# Patient Record
Sex: MALE | Race: BLACK OR AFRICAN AMERICAN | Employment: UNEMPLOYED | URBAN - METROPOLITAN AREA
[De-identification: names, ages, dates, MRNs, and addresses within clinical notes are randomized per-mention and may not be internally consistent; named-entity substitution may affect disease eponyms.]

---

## 2017-10-27 ENCOUNTER — OFFICE VISIT (OUTPATIENT)
Dept: FAMILY MEDICINE CLINIC | Age: 82
End: 2017-10-27

## 2017-10-27 VITALS
HEIGHT: 63 IN | DIASTOLIC BLOOD PRESSURE: 75 MMHG | BODY MASS INDEX: 20.02 KG/M2 | WEIGHT: 113 LBS | HEART RATE: 83 BPM | TEMPERATURE: 97.5 F | SYSTOLIC BLOOD PRESSURE: 169 MMHG

## 2017-10-27 DIAGNOSIS — I10 ESSENTIAL HYPERTENSION: ICD-10-CM

## 2017-10-27 DIAGNOSIS — E11.9 CONTROLLED TYPE 2 DIABETES MELLITUS WITHOUT COMPLICATION, WITHOUT LONG-TERM CURRENT USE OF INSULIN (HCC): Primary | ICD-10-CM

## 2017-10-27 DIAGNOSIS — G25.0 ESSENTIAL TREMOR: ICD-10-CM

## 2017-10-27 DIAGNOSIS — B35.1 ONYCHOMYCOSIS: ICD-10-CM

## 2017-10-27 DIAGNOSIS — N40.1 BENIGN PROSTATIC HYPERPLASIA (BPH) WITH STRAINING ON URINATION: ICD-10-CM

## 2017-10-27 DIAGNOSIS — R39.16 BENIGN PROSTATIC HYPERPLASIA (BPH) WITH STRAINING ON URINATION: ICD-10-CM

## 2017-10-27 DIAGNOSIS — E78.2 MIXED HYPERLIPIDEMIA: ICD-10-CM

## 2017-10-27 DIAGNOSIS — H53.8 BLURRY VISION, BILATERAL: ICD-10-CM

## 2017-10-27 DIAGNOSIS — Z79.899 ENCOUNTER FOR LONG-TERM (CURRENT) USE OF MEDICATIONS: ICD-10-CM

## 2017-10-27 DIAGNOSIS — Z23 ENCOUNTER FOR IMMUNIZATION: ICD-10-CM

## 2017-10-27 LAB
BILIRUB UR QL STRIP: NEGATIVE
GLUCOSE POC: 441 MG/DL
GLUCOSE UR-MCNC: NORMAL MG/DL
KETONES P FAST UR STRIP-MCNC: NEGATIVE MG/DL
PH UR STRIP: 6 [PH] (ref 4.6–8)
PROT UR QL STRIP: NEGATIVE MG/DL
SP GR UR STRIP: 1.02 (ref 1–1.03)
UA UROBILINOGEN AMB POC: NORMAL (ref 0.2–1)
URINALYSIS CLARITY POC: CLEAR
URINALYSIS COLOR POC: YELLOW
URINE BLOOD POC: NEGATIVE
URINE LEUKOCYTES POC: NORMAL
URINE NITRITES POC: NEGATIVE

## 2017-10-27 RX ORDER — SIMVASTATIN 20 MG/1
20 TABLET, FILM COATED ORAL
Qty: 30 TAB | Refills: 2 | Status: SHIPPED | OUTPATIENT
Start: 2017-10-27 | End: 2018-02-07 | Stop reason: SDUPTHER

## 2017-10-27 RX ORDER — DUTASTERIDE AND TAMSULOSIN HYDROCHLORIDE CAPSULES .5; .4 MG/1; MG/1
1 CAPSULE ORAL DAILY
Qty: 90 CAP | Refills: 3 | Status: SHIPPED | OUTPATIENT
Start: 2017-10-27 | End: 2017-12-27

## 2017-10-27 RX ORDER — GLIMEPIRIDE 2 MG/1
2 TABLET ORAL
Qty: 30 TAB | Refills: 2 | Status: SHIPPED | OUTPATIENT
Start: 2017-10-27 | End: 2018-02-07 | Stop reason: SDUPTHER

## 2017-10-27 RX ORDER — LISINOPRIL AND HYDROCHLOROTHIAZIDE 12.5; 2 MG/1; MG/1
1 TABLET ORAL DAILY
Qty: 30 TAB | Refills: 2 | Status: SHIPPED | OUTPATIENT
Start: 2017-10-27 | End: 2017-12-27

## 2017-10-27 RX ORDER — METFORMIN HYDROCHLORIDE 1000 MG/1
1000 TABLET ORAL 2 TIMES DAILY WITH MEALS
Qty: 60 TAB | Refills: 2 | Status: SHIPPED | OUTPATIENT
Start: 2017-10-27 | End: 2018-02-07 | Stop reason: SDUPTHER

## 2017-10-27 NOTE — PROGRESS NOTES
Subjective:     Chief Complaint   Patient presents with    Diabetes    Shaking     with the hands having a hard time eating x 5yrs    Immunization/Injection      He  is a 80 y.o. male who presents for evaluation of:  New pt. Coming from [facility]    Diabetes Mellitus:  Taking meds, home glucose monitoring: is performed regularly -has recently been using metformin and glimepiride sparingly to help avoid running out of medication and glucose readings have been in increasing into the 100-200 range. Of note, the patient's son reports to me that he had significant issues with hypoglycemia that led to a coma while in [facility] and ended up having to stay in a hospital and and [facility] for an additional few months until his sugars were well-controlled. Reports no polyuria or polydipsia, no chest pain, dyspnea or TIA's, no numbness, tingling or pain in extremities  Pt is a non smoker. No results found for: VMQ1OUSC, HBA1C, MCACR, MCAU, LDL, LDLC, DLDLP, DESIREE, CREAPOC, MCREA, ACREA, CREA, [id number], ALU2TBVT   No results found for: CGFR, GFRAA, GFRNA, CRCLT   No results found for: TSH, TSHEXT, TSHEXT      ROS  Gen - no fever/chills  Resp - no dyspnea or cough  CV - no chest pain or COYNE   - +nocturia, straining to urinate, post void dribbling, urinary frequency  Rest per HPI    No past medical history on file. No past surgical history on file. No current outpatient prescriptions on file prior to visit. No current facility-administered medications on file prior to visit.        Objective:     Vitals:    10/27/17 1348   BP: 169/75   Pulse: 83   Temp: 97.5 °F (36.4 °C)   TempSrc: Oral   Weight: 113 lb (51.3 kg)   Height: 5' 3.39\" (1.61 m)     Physical Examination:  General appearance - alert, well appearing, and in no distress  Eyes -sclera anicteric  Neck - supple, no significant adenopathy, no thyromegaly, no bruits  Chest - clear to auscultation, no wheezes, rales or rhonchi, symmetric air entry  Heart - normal rate, regular rhythm, normal S1, S2, no murmurs, rubs, clicks or gallops  Neurological - alert, oriented, no focal findings or movement disorder noted, + action tremor  Extr - no edema  Feet - normal exam with normal sensation including vibration, no skin breakdown, +onychomycosis    Assessment/ Plan:   Diagnoses and all orders for this visit:    1. Controlled type 2 diabetes mellitus without complication, without long-term current use of insulin ([address]- appears controlled, will check labs soon. Sending to ophthalmology for routine eye care. May end up seeing patient at [facility]. Nas's to be able to get him to [facility] for eye exam if unable to use access now and next appointment  -     AMB POC GLUCOSE BLOOD, BY GLUCOSE MONITORING DEVICE  -     AMB POC URINALYSIS DIP STICK MANUAL W/O MICRO  -     metFORMIN (GLUCOPHAGE) 1,000 mg tablet; Take 1 Tab by mouth two (2) times daily (with meals). -     glimepiride (AMARYL) 2 mg tablet; Take 1 Tab by mouth every morning.  -     HEMOGLOBIN A1C WITH EAG; Future  -     LIPID PANEL; Future  -     METABOLIC PANEL, COMPREHENSIVE; Future  -     TSH 3RD GENERATION; Future  -     REFERRAL TO OPHTHALMOLOGY    2. Mixed hyperlipidemia-stable on Zocor so refilling this and checking labs  -     simvastatin (ZOCOR) 20 mg tablet; Take 1 Tab by mouth nightly for 360 days. For cholesterol  -     HEMOGLOBIN A1C WITH EAG; Future  -     LIPID PANEL; Future  -     METABOLIC PANEL, COMPREHENSIVE; Future  -     TSH 3RD GENERATION; Future    3. Essential hypertension-uncontrolled today but did not take meds today. Switching from his ramipril/HCTZ to lisinopril/HCTZ for cost and will recheck blood pressure in 2 weeks  -     lisinopril-hydroCHLOROthiazide (PRINZIDE, ZESTORETIC) 20-12.5 mg per tablet; Take 1 Tab by mouth daily.  -     METABOLIC PANEL, COMPREHENSIVE; Future    4. Encounter for long-term (current) use of medications  -     CBC W/O DIFF; Future  -     HEMOGLOBIN A1C WITH EAG;  Future  - LIPID PANEL; Future  -     METABOLIC PANEL, COMPREHENSIVE; Future  -     TSH 3RD GENERATION; Future    5. Blurry vision, bilateral-will send to ophthalmology, will try to use access now and next appointment unless he does not qualify  -     REFERRAL TO OPHTHALMOLOGY    6. Benign prostatic hyperplasia (BPH) with straining on urination-symptoms consistent with BPH so we will start Callum to be picked up at crossover. -     Dutasteride-Tamsulosin (CALLUM) 0.5-0.4 mg CM24; Take 1 Tab by mouth daily. 7. Encounter for immunization  -     Influenza virus vaccine (QUADRIVALENT PRES FREE SYRINGE) IM ([id number])    8. Essential tremor-noted on exam, would consider propranolol depending on extent of tremor and blood pressure on current medication    I have discussed the diagnosis with the patient and the intended plan as seen in the above orders. The patient has received an after-visit summary and questions were answered concerning future plans. I have discussed medication side effects and warnings with the patient as well. The patient verbalizes understanding and agreement with the plan. Follow-up Disposition:  Return in about 2 weeks (around 11/10/2017), or if symptoms worsen or fail to improve, for blood pressure check.

## 2017-10-27 NOTE — PROGRESS NOTES
Requests flu vaccine. Denies fever and egg allergy. VIS information sheet given to patient. Explained possible s/e. Reviewed s/sx indicating need to be seen in ER. Patient had no adverse reaction at time of discharge. Entered into VIIS.

## 2017-10-27 NOTE — PROGRESS NOTES
Results for orders placed or performed in visit on 10/27/17   AMB POC GLUCOSE BLOOD, BY GLUCOSE MONITORING DEVICE   Result Value Ref Range    Glucose  mg/dL   AMB POC URINALYSIS DIP STICK MANUAL W/O MICRO   Result Value Ref Range    Color (UA POC) Yellow     Clarity (UA POC) Clear     Glucose (UA POC) 4+ Negative    Bilirubin (UA POC) Negative Negative    Ketones (UA POC) Negative Negative    Specific gravity (UA POC) 1.020 1.001 - 1.035    Blood (UA POC) Negative Negative    pH (UA POC) 6 4.6 - 8.0    Protein (UA POC) Negative Negative mg/dL    Urobilinogen (UA POC) normal 0.2 - 1    Nitrites (UA POC) Negative Negative    Leukocyte esterase (UA POC) Trace Negative

## 2017-10-27 NOTE — PROGRESS NOTES
Patient and his family member seen for discharge and no  needed per the patient's family member. We reviewed the AVS, prescriptions,pharmacy location and the Good Rx coupon card. The labs are to be drawn before his follow-up visit which has been scheduled. I spoke with Dr. Osmar Swift about his request to schedule the patient for a [facility] exam since this requires that the patient transfer to [facility] and complete a financial screening. Per Dr. Osmar Swift, the patient needs more than a basic exam at something like [facility]. Also, the patient travels back and forth from [facility] and likely has not been in [facility] for 6 months to qualify for [facility]. Per Dr. Osmar Swift, the need for a diabetic eye exam will be addressed at the patient's follow-up appointment and will probably be a referral to [facility] in order to schedule an appointment at [facility] eye. This was explained to the patient and his family member who expressed understanding.  Erich Peck RN

## 2017-11-08 ENCOUNTER — HOSPITAL ENCOUNTER (OUTPATIENT)
Dept: LAB | Age: 82
Discharge: HOME | End: 2017-11-08

## 2017-11-08 ENCOUNTER — TELEPHONE (OUTPATIENT)
Dept: FAMILY MEDICINE CLINIC | Age: 82
End: 2017-11-08

## 2017-11-08 ENCOUNTER — OFFICE VISIT (OUTPATIENT)
Dept: FAMILY MEDICINE CLINIC | Age: 82
End: 2017-11-08

## 2017-11-08 VITALS
WEIGHT: 114 LBS | HEART RATE: 73 BPM | BODY MASS INDEX: 19.95 KG/M2 | OXYGEN SATURATION: 97 % | SYSTOLIC BLOOD PRESSURE: 218 MMHG | TEMPERATURE: 97.6 F | DIASTOLIC BLOOD PRESSURE: 99 MMHG

## 2017-11-08 DIAGNOSIS — Z79.899 ENCOUNTER FOR LONG-TERM (CURRENT) USE OF MEDICATIONS: ICD-10-CM

## 2017-11-08 DIAGNOSIS — I10 ESSENTIAL HYPERTENSION: ICD-10-CM

## 2017-11-08 DIAGNOSIS — E78.2 MIXED HYPERLIPIDEMIA: ICD-10-CM

## 2017-11-08 DIAGNOSIS — E11.9 CONTROLLED TYPE 2 DIABETES MELLITUS WITHOUT COMPLICATION, WITHOUT LONG-TERM CURRENT USE OF INSULIN (HCC): Primary | ICD-10-CM

## 2017-11-08 LAB
ALBUMIN SERPL-MCNC: 3.8 G/DL (ref 3.5–5)
ALBUMIN/GLOB SERPL: 0.9 {RATIO} (ref 1.1–2.2)
ALP SERPL-CCNC: 52 U/L (ref 45–117)
ALT SERPL-CCNC: 15 U/L (ref 12–78)
ANION GAP SERPL CALC-SCNC: 6 MMOL/L (ref 5–15)
AST SERPL-CCNC: 18 U/L (ref 15–37)
BILIRUB SERPL-MCNC: 0.4 MG/DL (ref 0.2–1)
BUN SERPL-MCNC: 11 MG/DL (ref 6–20)
BUN/CREAT SERPL: 10 (ref 12–20)
CALCIUM SERPL-MCNC: 9.3 MG/DL (ref 8.5–10.1)
CHLORIDE SERPL-SCNC: 102 MMOL/L (ref 97–108)
CHOLEST SERPL-MCNC: 222 MG/DL
CO2 SERPL-SCNC: 29 MMOL/L (ref 21–32)
CREAT SERPL-MCNC: 1.06 MG/DL (ref 0.7–1.3)
ERYTHROCYTE [DISTWIDTH] IN BLOOD BY AUTOMATED COUNT: 12.9 % (ref 11.5–14.5)
EST. AVERAGE GLUCOSE BLD GHB EST-MCNC: 174 MG/DL
GLOBULIN SER CALC-MCNC: 4.1 G/DL (ref 2–4)
GLUCOSE POC: NORMAL MG/DL
GLUCOSE SERPL-MCNC: 139 MG/DL (ref 65–100)
HBA1C MFR BLD: 7.7 % (ref 4.2–6.3)
HCT VFR BLD AUTO: 34 % (ref 36.6–50.3)
HDLC SERPL-MCNC: 91 MG/DL
HDLC SERPL: 2.4 {RATIO} (ref 0–5)
HGB BLD-MCNC: 10.9 G/DL (ref 12.1–17)
LDLC SERPL CALC-MCNC: 117.8 MG/DL (ref 0–100)
LIPID PROFILE,FLP: ABNORMAL
MCH RBC QN AUTO: 28 PG (ref 26–34)
MCHC RBC AUTO-ENTMCNC: 32.1 G/DL (ref 30–36.5)
MCV RBC AUTO: 87.4 FL (ref 80–99)
PLATELET # BLD AUTO: 332 K/UL (ref 150–400)
POTASSIUM SERPL-SCNC: 5.1 MMOL/L (ref 3.5–5.1)
PROT SERPL-MCNC: 7.9 G/DL (ref 6.4–8.2)
RBC # BLD AUTO: 3.89 M/UL (ref 4.1–5.7)
SODIUM SERPL-SCNC: 137 MMOL/L (ref 136–145)
TRIGL SERPL-MCNC: 66 MG/DL (ref ?–150)
TSH SERPL DL<=0.05 MIU/L-ACNC: 2.54 UIU/ML (ref 0.36–3.74)
VLDLC SERPL CALC-MCNC: 13.2 MG/DL
WBC # BLD AUTO: 6.6 K/UL (ref 4.1–11.1)

## 2017-11-08 PROCEDURE — 83036 HEMOGLOBIN GLYCOSYLATED A1C: CPT | Performed by: FAMILY MEDICINE

## 2017-11-08 PROCEDURE — 80061 LIPID PANEL: CPT | Performed by: FAMILY MEDICINE

## 2017-11-08 PROCEDURE — 85027 COMPLETE CBC AUTOMATED: CPT | Performed by: FAMILY MEDICINE

## 2017-11-08 PROCEDURE — 84443 ASSAY THYROID STIM HORMONE: CPT | Performed by: FAMILY MEDICINE

## 2017-11-08 PROCEDURE — 80053 COMPREHEN METABOLIC PANEL: CPT | Performed by: FAMILY MEDICINE

## 2017-11-08 NOTE — TELEPHONE ENCOUNTER
Telephone call received from the patient's grandson asking where the [facility] clinic is today and whether the patient should still go to his appointment with the doctor today since he hasn't had his lab work done yet. Per chart review, the patient was instructed to have the labs done before today's provider visit so that the results could be discussed at today's visit. Advised the patient's grandson that the patient should still come to his appointment with the provider today at 1:15. One of the ordered labs requires fasting. The patient's grandson stated that they will bring the patient to the [place] [id number] clinic this morning for the lab work, then get something to eat and return to the clinic for the 1:15 appointment. They understand that the lab results will not be available at the provider appointment.  Julia Rodriguez RN

## 2017-11-08 NOTE — PATIENT INSTRUCTIONS

## 2017-11-08 NOTE — PROGRESS NOTES
AVS printed and reviewed. Medications highlighted and discussed. Instructed to bring all pill bottles to each visit for review. Today's blood pressure reviewed. Reasons to go to an ER reviewed. Signs/sx of stroke reviewed.

## 2017-11-08 NOTE — PROGRESS NOTES
Subjective:     Chief Complaint   Patient presents with    Diabetes     follow up         He  is a 80 y.o. male who presents for follow-up on HTN. Since LOV, Pt just got labs done this AM.     Pt is accompanied by family members and they do not know which medications he is on. Son reached via phone referred to a notebook. Pt denies any CP, palpitations, visual changes nor dizziness/HA. Son via telephone notes Pt is also taking insulin. Sugars at home have been ranging in the low 100s-160s. ROS  Gen - no fever/chills  Resp - no dyspnea or cough  CV - no chest pain or COYNE  Rest per HPI    Past Medical History:   Diagnosis Date    Controlled type 2 diabetes mellitus without complication, without long-term current use of insulin ([facility] [address] 10/27/2017    Essential hypertension 10/27/2017    Mixed hyperlipidemia 10/27/2017     No past surgical history on file. Current Outpatient Prescriptions on File Prior to Visit   Medication Sig Dispense Refill    metFORMIN (GLUCOPHAGE) 1,000 mg tablet Take 1 Tab by mouth two (2) times daily (with meals). 60 Tab 2    lisinopril-hydroCHLOROthiazide (PRINZIDE, ZESTORETIC) 20-12.5 mg per tablet Take 1 Tab by mouth daily. 30 Tab 2    glimepiride (AMARYL) 2 mg tablet Take 1 Tab by mouth every morning. 30 Tab 2    simvastatin (ZOCOR) 20 mg tablet Take 1 Tab by mouth nightly for 360 days. For cholesterol 30 Tab 2    Dutasteride-Tamsulosin (CALLUM) 0.5-0.4 mg CM24 Take 1 Tab by mouth daily. 90 Cap 3     No current facility-administered medications on file prior to visit.          Objective:     Vitals:    11/08/17 1123 11/08/17 1127   BP: 200/90 (!) 218/99   Pulse: 73    Temp: 97.6 °F (36.4 °C)    TempSrc: Oral    SpO2: 97%    Weight: 114 lb (51.7 kg)        Physical Examination:  General appearance - alert, well appearing, and in no distress  Eyes -sclera anicteric  Neck - supple, no significant adenopathy, no thyromegaly  Chest - clear to auscultation, no wheezes, rales or rhonchi, symmetric air entry  Heart - normal rate, regular rhythm, normal S1, S2, no murmurs, rubs, clicks or gallops  Neurological - alert, oriented, no focal findings or movement disorder noted  Abdomen-BS present/WNL x 4 quads, non-tender/distended, soft,no organomegaly    Assessment/ Plan:   Diagnoses and all orders for this visit:    1. Controlled type 2 diabetes mellitus without complication, without long-term current use of insulin (HCC)  -     AMB POC GLUCOSE BLOOD, BY GLUCOSE MONITORING DEVICE  -     HEMOGLOBIN A1C WITH EAG  -     LIPID PANEL  -     METABOLIC PANEL, COMPREHENSIVE  -     TSH 3RD GENERATION    2. Encounter for long-term (current) use of medications  -     CBC W/O DIFF  -     HEMOGLOBIN A1C WITH EAG  -     LIPID PANEL  -     METABOLIC PANEL, COMPREHENSIVE  -     TSH 3RD GENERATION    3. Mixed hyperlipidemia  -     HEMOGLOBIN A1C WITH EAG  -     LIPID PANEL  -     METABOLIC PANEL, COMPREHENSIVE  -     TSH 3RD GENERATION    4. Essential hypertension  -     METABOLIC PANEL, COMPREHENSIVE       No S&S of HTN crisi. Strongly counseled g-son present that he needs to confirm Pt is taking BP Rx w/ his father and/or pharmacy. Given confusion r/t medications, will have Pt RTC in 1-2 days for RN visit for med rec visit. Advised to bring BP log to follow-up RN visit. Can adjust medications PRN if BP still elevated while taking HTN Rx.     ? BP response since there is no confirmation Pt has been taking Lisinopril/HCTZ for BP. Change POC PRN based on med rec, labs and BP response. I have discussed the diagnosis with the patient and the intended plan as seen in the above orders. The patient has received an after-visit summary and questions were answered concerning future plans. I have discussed medication side effects and warnings with the patient as well. The patient verbalizes understanding and agreement with the plan.     Follow-up Disposition: Not on File

## 2017-11-08 NOTE — LETTER
11/21/2017 2:55 PM 
 
Mr. Valdo Kramer [address] [id number] Dear Valdo Kramer: 
 
Please find your most recent results below. Resulted Orders HEMOGLOBIN A1C WITH EAG Result Value Ref Range Hemoglobin A1c 7.7 (H) 4.2 - 6.3 % Est. average glucose 174 mg/dL Comment:  
   (NOTE) The eAG should be interpreted with patient characteristics in mind  
since ethnicity, interindividual differences, red cell lifespan,  
variation in rates of glycation, etc. may affect the validity of the  
calculation. CBC W/O DIFF Result Value Ref Range WBC 6.6 4.1 - 11.1 K/uL  
 RBC 3.89 (L) 4.10 - 5.70 M/uL  
 HGB 10.9 (L) 12.1 - 17.0 g/dL HCT 34.0 (L) 36.6 - 50.3 % MCV 87.4 80.0 - 99.0 FL  
 MCH 28.0 26.0 - 34.0 PG  
 MCHC 32.1 30.0 - 36.5 g/dL  
 RDW 12.9 11.5 - 14.5 % PLATELET 208 352 - 715 K/uL METABOLIC PANEL, COMPREHENSIVE Result Value Ref Range Sodium 137 136 - 145 mmol/L Potassium 5.1 3.5 - 5.1 mmol/L Chloride 102 97 - 108 mmol/L  
 CO2 29 21 - 32 mmol/L Anion gap 6 5 - 15 mmol/L Glucose 139 (H) 65 - 100 mg/dL BUN 11 6 - 20 MG/DL Creatinine 1.06 0.70 - 1.30 MG/DL  
 BUN/Creatinine ratio 10 (L) 12 - 20 GFR est AA >60 >60 ml/min/1.73m2 GFR est non-AA >60 >60 ml/min/1.73m2 Comment:  
   Estimated GFR is calculated using the IDMS-traceable Modification of Diet in Renal Disease (MDRD) Study equation, reported for both  Americans (GFRAA) and non- Americans (GFRNA), and normalized to 1.73m2 body surface area. The physician must decide which value applies to the patient. The MDRD study equation should only be used in individuals age 25 or older. It has not been validated for the following: pregnant women, patients with serious comorbid conditions, or on certain medications, or persons with extremes of body size, muscle mass, or nutritional status. Calcium 9.3 8.5 - 10.1 MG/DL  Bilirubin, total 0.4 0.2 - 1.0 MG/DL  
 ALT (SGPT) 15 12 - 78 U/L  
 AST (SGOT) 18 15 - 37 U/L Alk. phosphatase 52 45 - 117 U/L Protein, total 7.9 6.4 - 8.2 g/dL Albumin 3.8 3.5 - 5.0 g/dL Globulin 4.1 (H) 2.0 - 4.0 g/dL A-G Ratio 0.9 (L) 1.1 - 2.2 LIPID PANEL Result Value Ref Range LIPID PROFILE Cholesterol, total 222 (H) <200 MG/DL Triglyceride 66 <150 MG/DL Comment:  
   Based on NCEP-ATP III:  Triglycerides <150 mg/dL  is considered normal, 150-199 mg/dL  borderline high,  200-499 mg/dL high and  greater than or equal to 500 mg/dL very high. HDL Cholesterol 91 MG/DL Comment:  
   Based on NCEP ATP III, HDL Cholesterol <40 mg/dL is considered low and >60 mg/dL is elevated. LDL, calculated 117.8 (H) 0 - 100 MG/DL Comment:  
   Based on the NCEP-ATP: LDL-C concentrations are considered  optimal <100 mg/dL, 
near optimal/above Normal 100-129 mg/dL Borderline High: 130-159, High: 160-189 mg/dL Very High: Greater than or equal to 190 mg/dL VLDL, calculated 13.2 MG/DL  
 CHOL/HDL Ratio 2.4 0 - 5.0 TSH 3RD GENERATION Result Value Ref Range TSH 2.54 0.36 - 3.74 uIU/mL Comment:  
   (NOTE) Due to TSH heterogeneity, both structurally and degree of  
glycosylation, monoclonal antibodies used in the TSH assay may not  
accurately quantitate TSH. Therefore, this result should be  
correlated with clinical findings as well as with other assessments  
of thyroid function, e.g., free T4, free T3. 
  
 
 
RECOMMENDATIONS: Here is the message from Dr. Genia Kunz:  
Carter Medrano MD  [facility]    
    
   
    
  Diabetes control is close to goal and cholesterol is slightly high.  Hemoglobin slightly low at 10.9.  Planning to continue the same medications and make changes after rechecking labs in the future Please call me if you have any questions: [phone] Sincerely, 
 
 
Dr. Hamilton Garcia RN

## 2017-11-08 NOTE — PROGRESS NOTES
Coordination of Care  1. Have you been to the ER, urgent care clinic since your last visit? Hospitalized since your last visit? No    2. Have you seen or consulted any other health care providers outside of the [address] [place] [address] since your last visit? Include any pap smears or colon screening. No    Medications  Does the patient need refills?  YES    Learning Assessment Complete? yes    Results for orders placed or performed in visit on 11/08/17   AMB POC GLUCOSE BLOOD, BY GLUCOSE MONITORING DEVICE   Result Value Ref Range    Glucose  fasting mg/dL

## 2017-11-08 NOTE — MR AVS SNAPSHOT
Visit Information Date & Time Provider Department Dept. Phone Encounter #  
 11/8/2017 11:30 AM Zeenat Velasco NP [facility] 89 [id number] Follow-up Instructions Return in about 2 weeks (around 11/22/2017). Upcoming Health Maintenance Date Due HEMOGLOBIN A1C Q6M 1934 LIPID PANEL Q1 1934 MICROALBUMIN Q1 1/1/1944 EYE EXAM RETINAL OR DILATED Q1 1/1/1944 DTaP/Tdap/Td series (1 - Tdap) 1/1/1955 ZOSTER VACCINE AGE 60> 11/1/1993 GLAUCOMA SCREENING Q2Y 1/1/1999 Pneumococcal 65+ Low/Medium Risk (1 of 2 - PCV13) 1/1/1999 MEDICARE YEARLY EXAM 1/1/1999 FOOT EXAM Q1 10/27/2018 Allergies as of 11/8/2017  Review Complete On: 11/8/2017 By: Zeenat Velasco NP No Known Allergies Current Immunizations  Reviewed on 10/27/2017 Name Date Influenza Vaccine (Quad) PF 10/27/2017 Not reviewed this visit You Were Diagnosed With   
  
 Codes Comments Controlled type 2 diabetes mellitus without complication, without long-term current use of insulin ([facility] [address]    -  Primary ICD-10-CM: E11.9 ICD-9-CM: 250.00 Encounter for long-term (current) use of medications     ICD-10-CM: Z79.899 ICD-9-CM: V58.69 Mixed hyperlipidemia     ICD-10-CM: E78.2 ICD-9-CM: 272.2 Essential hypertension     ICD-10-CM: I10 
ICD-9-CM: 401.9 Vitals BP Pulse Temp Weight(growth percentile) SpO2 BMI  
 (!) 218/99 (BP 1 Location: Left arm, BP Patient Position: Sitting) 73 97.6 °F (36.4 °C) (Oral) 114 lb (51.7 kg) 97% 19.95 kg/m2 Smoking Status Never Smoker Vitals History BMI and BSA Data Body Mass Index Body Surface Area  
 19.95 kg/m 2 1.52 m 2 Preferred Pharmacy Pharmacy Name Phone [address] [phone] Your Updated Medication List  
  
   
This list is accurate as of: 11/8/17  1:43 PM.  Always use your most recent med list.  
  
  
  
  
 Dutasteride-Tamsulosin 0.5-0.4 mg Cm24 Commonly known as:  Ginny Teran Take 1 Tab by mouth daily. glimepiride 2 mg tablet Commonly known as:  AMARYL Take 1 Tab by mouth every morning. lisinopril-hydroCHLOROthiazide 20-12.5 mg per tablet Commonly known as:  Kendall Jean Take 1 Tab by mouth daily. metFORMIN 1,000 mg tablet Commonly known as:  GLUCOPHAGE Take 1 Tab by mouth two (2) times daily (with meals). simvastatin 20 mg tablet Commonly known as:  ZOCOR Take 1 Tab by mouth nightly for 360 days. For cholesterol We Performed the Following AMB POC GLUCOSE BLOOD, BY GLUCOSE MONITORING DEVICE [20212 CPT(R)] CBC W/O DIFF [79356 CPT(R)] HEMOGLOBIN A1C WITH EAG [63721 CPT(R)] LIPID PANEL [66250 CPT(R)] METABOLIC PANEL, COMPREHENSIVE [07733 CPT(R)] TSH 3RD GENERATION [55452 CPT(R)] Follow-up Instructions Return in about 2 weeks (around 11/22/2017). Patient Instructions DASH Diet: Care Instructions Your Care Instructions The DASH diet is an eating plan that can help lower your blood pressure. DASH stands for Dietary Approaches to Stop Hypertension. Hypertension is high blood pressure. The DASH diet focuses on eating foods that are high in calcium, potassium, and magnesium. These nutrients can lower blood pressure. The foods that are highest in these nutrients are fruits, vegetables, low-fat dairy products, nuts, seeds, and legumes. But taking calcium, potassium, and magnesium supplements instead of eating foods that are high in those nutrients does not have the same effect. The DASH diet also includes whole grains, fish, and poultry. The DASH diet is one of several lifestyle changes your doctor may recommend to lower your high blood pressure. Your doctor may also want you to decrease the amount of sodium in your diet.  Lowering sodium while following the DASH diet can lower blood pressure even further than just the DASH diet alone. Follow-up care is a key part of your treatment and safety. Be sure to make and go to all appointments, and call your doctor if you are having problems. It's also a good idea to know your test results and keep a list of the medicines you take. How can you care for yourself at home? Following the DASH diet · Eat 4 to 5 servings of fruit each day. A serving is 1 medium-sized piece of fruit, ½ cup chopped or canned fruit, 1/4 cup dried fruit, or 4 ounces (½ cup) of fruit juice. Choose fruit more often than fruit juice. · Eat 4 to 5 servings of vegetables each day. A serving is 1 cup of lettuce or raw leafy vegetables, ½ cup of chopped or cooked vegetables, or 4 ounces (½ cup) of vegetable juice. Choose vegetables more often than vegetable juice. · Get 2 to 3 servings of low-fat and fat-free dairy each day. A serving is 8 ounces of milk, 1 cup of yogurt, or 1 ½ ounces of cheese. · Eat 6 to 8 servings of grains each day. A serving is 1 slice of bread, 1 ounce of dry cereal, or ½ cup of cooked rice, pasta, or cooked cereal. Try to choose whole-grain products as much as possible. · Limit lean meat, poultry, and fish to 2 servings each day. A serving is 3 ounces, about the size of a deck of cards. · Eat 4 to 5 servings of nuts, seeds, and legumes (cooked dried beans, lentils, and split peas) each week. A serving is 1/3 cup of nuts, 2 tablespoons of seeds, or ½ cup of cooked beans or peas. · Limit fats and oils to 2 to 3 servings each day. A serving is 1 teaspoon of vegetable oil or 2 tablespoons of salad dressing. · Limit sweets and added sugars to 5 servings or less a week. A serving is 1 tablespoon jelly or jam, ½ cup sorbet, or 1 cup of lemonade. · Eat less than 2,300 milligrams (mg) of sodium a day. If you limit your sodium to 1,500 mg a day, you can lower your blood pressure even more. Tips for success · Start small. Do not try to make dramatic changes to your diet all at once. You might feel that you are missing out on your favorite foods and then be more likely to not follow the plan. Make small changes, and stick with them. Once those changes become habit, add a few more changes. · Try some of the following: ¨ Make it a goal to eat a fruit or vegetable at every meal and at snacks. This will make it easy to get the recommended amount of fruits and vegetables each day. ¨ Try yogurt topped with fruit and nuts for a snack or healthy dessert. ¨ Add lettuce, tomato, cucumber, and onion to sandwiches. ¨ Combine a ready-made pizza crust with low-fat mozzarella cheese and lots of vegetable toppings. Try using tomatoes, squash, spinach, broccoli, carrots, cauliflower, and onions. ¨ Have a variety of cut-up vegetables with a low-fat dip as an appetizer instead of chips and dip. ¨ Sprinkle sunflower seeds or chopped almonds over salads. Or try adding chopped walnuts or almonds to cooked vegetables. ¨ Try some vegetarian meals using beans and peas. Add garbanzo or kidney beans to salads. Make burritos and tacos with mashed king beans or black beans. Where can you learn more? Go to http://demond-felicia.info/. Enter E916 in the search box to learn more about \"DASH Diet: Care Instructions. \" Current as of: September 21, 2016 Content Version: 11.4 © 6445-5606 [employer]. Care instructions adapted under license by [employer] (which disclaims liability or warranty for this information). If you have questions about a medical condition or this instruction, always ask your healthcare professional. [place] [address] any warranty or liability for your use of this information. Introducing [facility]!    
 [facility] introduces DuckDuckGo patient portal. Now you can access parts of your medical record, email your doctor's office, and request medication refills online. 1. In your internet browser, go to [email or web address] 2. Click on the First Time User? Click Here link in the Sign In box. You will see the New Member Sign Up page. 3. Enter your Partnered Access Code exactly as it appears below. You will not need to use this code after youve completed the sign-up process. If you do not sign up before the expiration date, you must request a new code. · Partnered Access Code: [email or web address] Expires: 1/25/2018  2:31 PM 
 
4. Enter the last four digits of your Social Security Number (xxxx) and Date of Birth (mm/dd/yyyy) as indicated and click Submit. You will be taken to the next sign-up page. 5. Create a Partnered ID. This will be your Partnered login ID and cannot be changed, so think of one that is secure and easy to remember. 6. Create a Partnered password. You can change your password at any time. 7. Enter your Password Reset Question and Answer. This can be used at a later time if you forget your password. 8. Enter your e-mail address. You will receive e-mail notification when new information is available in [address] 9. Click Sign Up. You can now view and download portions of your medical record. 10. Click the Download Summary menu link to download a portable copy of your medical information. If you have questions, please visit the Frequently Asked Questions section of the Partnered website. Remember, Partnered is NOT to be used for urgent needs. For medical emergencies, dial 911. Now available from your iPhone and Android! Please provide this summary of care documentation to your next provider. If you have any questions after today's visit, please call [phone].

## 2017-11-09 NOTE — PROGRESS NOTES
Diabetes control is close to goal and cholesterol is slightly high. Hemoglobin slightly low at 10.9. Planning to continue the same medications and make changes after rechecking labs in the future.     Looks like Serge Membreno saw him just yesterday and his blood pressure continues to be a major issue with hypertensive urgency

## 2017-11-16 ENCOUNTER — CLINICAL SUPPORT (OUTPATIENT)
Dept: FAMILY MEDICINE CLINIC | Age: 82
End: 2017-11-16

## 2017-11-16 VITALS — DIASTOLIC BLOOD PRESSURE: 65 MMHG | HEART RATE: 79 BPM | SYSTOLIC BLOOD PRESSURE: 152 MMHG

## 2017-11-16 DIAGNOSIS — Z71.9 COUNSELED BY NURSE: Primary | ICD-10-CM

## 2017-11-16 DIAGNOSIS — R39.16 BENIGN PROSTATIC HYPERPLASIA (BPH) WITH STRAINING ON URINATION: ICD-10-CM

## 2017-11-16 DIAGNOSIS — N40.1 BENIGN PROSTATIC HYPERPLASIA (BPH) WITH STRAINING ON URINATION: ICD-10-CM

## 2017-11-16 RX ORDER — GUAIFENESIN 100 MG/5ML
100 LIQUID (ML) ORAL DAILY
COMMUNITY
End: 2018-01-21

## 2017-11-16 NOTE — PROGRESS NOTES
Pt here today with granddaughter Tony Hannon and I also talked with son, Livia Johnson over the phone. They have not been checking blood pressures at home because they did not understand to do this. I talked with granddaughter about please buying a blood pressure cuff and how to correctly measure his bp. Intake took his bp today and it was 152/65 while sitting in left arm. Pt is taking all meds as prescribed. He brought in an empty box of ASA that he was taking. He also brought in a bag of little white pills that were unlabeled and an RX from [place] country that are to be taken one as needed when his \"mouth swells\". He took this 3 times last week. His family has a medical record that they will review with information in [facility] and they think they will be able to figure out what this medication is called. Pt has not picked up his Kay from [facility] because he does not have an application in for [facility]. This med will be $54 for one month supply at [facility]. I told them to hold off on this at this time and to discuss at follow up. I did ask family to please bring in recorded blood pressures at next follow up.  Kathi Sharif RN

## 2017-11-21 NOTE — PROGRESS NOTES
I mailed pt his lab results with message from provider. He has family that speak [place].  Leopoldo Sheridan RN

## 2017-11-30 ENCOUNTER — OFFICE VISIT (OUTPATIENT)
Dept: FAMILY MEDICINE CLINIC | Age: 82
End: 2017-11-30

## 2017-11-30 VITALS
DIASTOLIC BLOOD PRESSURE: 82 MMHG | SYSTOLIC BLOOD PRESSURE: 180 MMHG | WEIGHT: 112 LBS | TEMPERATURE: 97.5 F | BODY MASS INDEX: 19.6 KG/M2 | HEART RATE: 77 BPM

## 2017-11-30 DIAGNOSIS — Z13.1 SCREENING FOR DIABETES MELLITUS (DM): ICD-10-CM

## 2017-11-30 DIAGNOSIS — E11.9 CONTROLLED TYPE 2 DIABETES MELLITUS WITHOUT COMPLICATION, WITHOUT LONG-TERM CURRENT USE OF INSULIN (HCC): ICD-10-CM

## 2017-11-30 DIAGNOSIS — I10 ESSENTIAL HYPERTENSION: Primary | ICD-10-CM

## 2017-11-30 LAB — GLUCOSE POC: NORMAL MG/DL

## 2017-11-30 NOTE — MR AVS SNAPSHOT
Visit Information Date & Time Provider Department Dept. Phone Encounter #  
 11/30/2017  9:45 AM Caryl Vinson MD [address] [phone] [id number] Follow-up Instructions Return in about 3 months (around 2/28/2018). Upcoming Health Maintenance Date Due MICROALBUMIN Q1 1/1/1944 EYE EXAM RETINAL OR DILATED Q1 1/1/1944 DTaP/Tdap/Td series (1 - Tdap) 1/1/1955 ZOSTER VACCINE AGE 60> 11/1/1993 GLAUCOMA SCREENING Q2Y 1/1/1999 Pneumococcal 65+ Low/Medium Risk (1 of 2 - PCV13) 1/1/1999 MEDICARE YEARLY EXAM 1/1/1999 HEMOGLOBIN A1C Q6M 5/8/2018 FOOT EXAM Q1 10/27/2018 LIPID PANEL Q1 11/8/2018 Allergies as of 11/30/2017  Review Complete On: 11/30/2017 By: Caryl Vinson MD  
 No Known Allergies Current Immunizations  Reviewed on 10/27/2017 Name Date Influenza Vaccine (Quad) PF 10/27/2017 Not reviewed this visit You Were Diagnosed With   
  
 Codes Comments Screening for diabetes mellitus (DM)    -  Primary ICD-10-CM: Z13.1 ICD-9-CM: V77.1 Vitals BP Pulse Temp Weight(growth percentile) BMI Smoking Status 180/82 (BP 1 Location: Left arm, BP Patient Position: Sitting) 77 97.5 °F (36.4 °C) (Oral) 112 lb (50.8 kg) 19.6 kg/m2 Never Smoker Vitals History BMI and BSA Data Body Mass Index Body Surface Area 19.6 kg/m 2 1.51 m 2 Preferred Pharmacy Pharmacy Name Phone [address] Your Updated Medication List  
  
   
This list is accurate as of: 11/30/17 11:32 AM.  Always use your most recent med list.  
  
  
  
  
 aspirin 81 mg chewable tablet Take 100 mg by mouth daily. Pt brought in box of Aspirin that was 100 mg tablets. B COMPLEX 1 PO Take  by mouth two (2) times a day. With folic acid and vitamin C.  
  
 CALCIUM CITRATE + PO Take  by mouth daily. Dutasteride-Tamsulosin 0.5-0.4 mg Cm24 Commonly known as:  Noble Nephew Take 1 Tab by mouth daily. glimepiride 2 mg tablet Commonly known as:  AMARYL Take 1 Tab by mouth every morning. lisinopril-hydroCHLOROthiazide 20-12.5 mg per tablet Commonly known as:  Katrinka Fat Take 1 Tab by mouth daily. metFORMIN 1,000 mg tablet Commonly known as:  GLUCOPHAGE Take 1 Tab by mouth two (2) times daily (with meals). OTHER Pt brought in a bag of little white pills that he takes for his mouth when it is swollen as needed. It was RX from [place] country and is unlabeled. He took it 3 times last week. simvastatin 20 mg tablet Commonly known as:  ZOCOR Take 1 Tab by mouth nightly for 360 days. For cholesterol We Performed the Following AMB POC GLUCOSE BLOOD, BY GLUCOSE MONITORING DEVICE [19735 CPT(R)] Follow-up Instructions Return in about 3 months (around 2/28/2018). Introducing [facility]! Randee Alarcon introduces BookNow patient portal. Now you can access parts of your medical record, email your doctor's office, and request medication refills online. 1. In your internet browser, go to [email or web address] 2. Click on the First Time User? Click Here link in the Sign In box. You will see the New Member Sign Up page. 3. Enter your BookNow Access Code exactly as it appears below. You will not need to use this code after youve completed the sign-up process. If you do not sign up before the expiration date, you must request a new code. · BookNow Access Code: [email or web address] Expires: 1/25/2018  2:31 PM 
 
4. Enter the last four digits of your Social Security Number (xxxx) and Date of Birth (mm/dd/yyyy) as indicated and click Submit. You will be taken to the next sign-up page. 5. Create a [employer] ID. This will be your [employer] login ID and cannot be changed, so think of one that is secure and easy to remember. 6. Create a Bloxr password. You can change your password at any time. 7. Enter your Password Reset Question and Answer. This can be used at a later time if you forget your password. 8. Enter your e-mail address. You will receive e-mail notification when new information is available in [address] 9. Click Sign Up. You can now view and download portions of your medical record. 10. Click the Download Summary menu link to download a portable copy of your medical information. If you have questions, please visit the Frequently Asked Questions section of the Bloxr website. Remember, Bloxr is NOT to be used for urgent needs. For medical emergencies, dial 911. Now available from your iPhone and Android! Please provide this summary of care documentation to your next provider. If you have any questions after today's visit, please call [phone].

## 2017-11-30 NOTE — PROGRESS NOTES
Coordination of Care  1. Have you been to the ER, urgent care clinic since your last visit? Hospitalized since your last visit? No    2. Have you seen or consulted any other health care providers outside of the [address] [place] [address] since your last visit? Include any pap smears or colon screening. No    Medications  Does the patient need refills?  YES    Learning Assessment Complete? yes  Results for orders placed or performed in visit on 11/30/17   AMB POC GLUCOSE BLOOD, BY GLUCOSE MONITORING DEVICE   Result Value Ref Range    Glucose  NF mg/dL

## 2017-11-30 NOTE — PROGRESS NOTES
Alexis Michael is a 80 y.o. male comes today accompanied by his grandson. Issues discussed today include:    Chief Complaint   Patient presents with    Follow-up     For DM       1) HTN:  Checking BP at [facility] occasionally, grandson shows me log - sometimes 120-140s/70-80s. A few measurements high like today. He took his BP med this am. Taking prinzide 20-12.5mg daily. He c/o occasional HA and blurry vision for years. Denies CP, SOB, palpitations. Pt is 81yo, but grandson says the family thinks he's old bc they don't have his birth certificate and they just gave him the birthdate of [birth year]. 2) DMII:  Checking glucose at home, not every day. Was diagnosed with DM 1 yr ago. Is here visiting from [facility]. Is taking metformin 1000mg bid and amaryl 2mg daily, was on these in [facility] as well. Last A1c 11/8/17 was 7.7%. Taken a statin. Denies side effects or hypoglycemia sxs. Data reviewed or ordered today:       Other problems include:  Patient Active Problem List   Diagnosis Code    Controlled type 2 diabetes mellitus without complication, without long-term current use of insulin [facility] E11.9    Mixed hyperlipidemia E78.2    Essential hypertension I10       Medications:  Current Outpatient Prescriptions   Medication Sig Dispense Refill    aspirin 81 mg chewable tablet Take 100 mg by mouth daily. Pt brought in box of Aspirin that was 100 mg tablets.  VITAMIN B COMPLEX (B COMPLEX 1 PO) Take  by mouth two (2) times a day. With folic acid and vitamin C.      CALCIUM CITRATE/VITAMIN D3 (CALCIUM CITRATE + PO) Take  by mouth daily.  OTHER Pt brought in a bag of little white pills that he takes for his mouth when it is swollen as needed. It was RX from [place] country and is unlabeled. He took it 3 times last week.  metFORMIN (GLUCOPHAGE) 1,000 mg tablet Take 1 Tab by mouth two (2) times daily (with meals).  60 Tab 2    lisinopril-hydroCHLOROthiazide (PRINZIDE, ZESTORETIC) 20-12.5 mg per tablet Take 1 Tab by mouth daily. 30 Tab 2    glimepiride (AMARYL) 2 mg tablet Take 1 Tab by mouth every morning. 30 Tab 2    simvastatin (ZOCOR) 20 mg tablet Take 1 Tab by mouth nightly for 360 days. For cholesterol 30 Tab 2    Dutasteride-Tamsulosin (CALLUM) 0.5-0.4 mg CM24 Take 1 Tab by mouth daily. 90 Cap 3       Allergies:  No Known Allergies    LMP:  No LMP for male patient. Social History     Social History    Marital status:      Spouse name: N/A    Number of children: N/A    Years of education: N/A     Occupational History    Not on file. Social History Main Topics    Smoking status: Never Smoker    Smokeless tobacco: Never Used    Alcohol use No    Drug use: No    Sexual activity: Not on file     Other Topics Concern    Not on file     Social History Narrative       No family history on file. Physical Exam   Visit Vitals    /82 (BP 1 Location: Left arm, BP Patient Position: Sitting)    Pulse 77    Temp 97.5 °F (36.4 °C) (Oral)    Wt 112 lb (50.8 kg)    BMI 19.6 kg/m2      BP Readings from Last 3 Encounters:   11/30/17 180/82   11/16/17 152/65   11/08/17 (!) 218/99     Constitutional: Appears well,  No acute distress, Vitals noted  Psychiatric:  Affect normal, Alert and Oriented to person/place/time  Eyes:  Conjunctiva clear, no drainage  ENT:  External ears and nose normal, Mucous membranes moist  Neck:  General inspection normal. Supple. Heart:  Normal HR, Normal S1 and S2,  Regular rhythm. No murmurs, rubs or gallops. Lungs:  Clear to auscultation, good respiratory effort, no wheezes, rales or rhonchi  Extremities: Without edema, good peripheral pulses  Skin:  Warm to palpation, without rashes      Lab Results   Component Value Date/Time    Glucose 139 11/08/2017 11:56 AM    Glucose  NF 11/30/2017 10:10 AM         Assessment/Plan:      ICD-10-CM ICD-9-CM    1. Essential hypertension I10 401.9    2.  Controlled type 2 diabetes mellitus without complication, without long-term current use of insulin (HCC) E11.9 250.00    3. Screening for diabetes mellitus (DM) Z13.1 V77.1 AMB POC GLUCOSE BLOOD, BY GLUCOSE MONITORING DEVICE       BP very high today. May need more medicine, but want a bit more information from home monitoring. Check BP once weekly, keep log and bring to f/u appt. RTC sooner if > 160/100 consistently    Last A1c fine for pt's age. Continue current regimen for now. Needs more glucose testing strips - grandson told to call family to know brand, so I can send rx or he can know what to get OTC  Check glucose several times per week and keep log, bring to next appt as well. RTC sooner if glucose < 80    Optometry for eye exam with request to fax report to us      Follow-up Disposition:  Return in about 3 months (around 2/28/2018).         Farheen Arceo MD  [address] [place] [address]

## 2017-11-30 NOTE — PROGRESS NOTES
AVS printed and reviewed. Instructed to check BP and sugars per provider notes, record and bring to doctor visits. Printed information on Hypertension and dash diet and given to pt and grandson. [place]. Given flyer on local eye clinic . Grandson hesitant on taking to an eye exam that will have bills. Pt asked about [facility]. Discussed [address] clinic, financial screening for [address] and [facility] clinic. Reviewed slow process time line.

## 2017-12-27 ENCOUNTER — OFFICE VISIT (OUTPATIENT)
Dept: FAMILY MEDICINE CLINIC | Age: 82
End: 2017-12-27

## 2017-12-27 VITALS
HEIGHT: 63 IN | DIASTOLIC BLOOD PRESSURE: 69 MMHG | BODY MASS INDEX: 20.02 KG/M2 | SYSTOLIC BLOOD PRESSURE: 172 MMHG | WEIGHT: 113 LBS | HEART RATE: 59 BPM | TEMPERATURE: 97.7 F

## 2017-12-27 DIAGNOSIS — N40.1 BENIGN PROSTATIC HYPERPLASIA WITH URINARY HESITANCY: ICD-10-CM

## 2017-12-27 DIAGNOSIS — Z13.0 SCREENING FOR DEFICIENCY ANEMIA: ICD-10-CM

## 2017-12-27 DIAGNOSIS — I10 ESSENTIAL HYPERTENSION: ICD-10-CM

## 2017-12-27 DIAGNOSIS — L85.3 DRY SKIN DERMATITIS: ICD-10-CM

## 2017-12-27 DIAGNOSIS — E11.9 CONTROLLED TYPE 2 DIABETES MELLITUS WITHOUT COMPLICATION, WITHOUT LONG-TERM CURRENT USE OF INSULIN (HCC): Primary | ICD-10-CM

## 2017-12-27 DIAGNOSIS — R39.11 BENIGN PROSTATIC HYPERPLASIA WITH URINARY HESITANCY: ICD-10-CM

## 2017-12-27 LAB
GLUCOSE POC: NORMAL MG/DL
HGB BLD-MCNC: 10.1 G/DL

## 2017-12-27 RX ORDER — AMLODIPINE BESYLATE 5 MG/1
5 TABLET ORAL DAILY
Qty: 90 TAB | Refills: 0 | Status: SHIPPED | OUTPATIENT
Start: 2017-12-27 | End: 2018-01-25

## 2017-12-27 RX ORDER — TAMSULOSIN HYDROCHLORIDE 0.4 MG/1
0.4 CAPSULE ORAL
Qty: 30 CAP | Refills: 2 | Status: SHIPPED | OUTPATIENT
Start: 2017-12-27 | End: 2018-03-13 | Stop reason: SDUPTHER

## 2017-12-27 RX ORDER — TRIAMCINOLONE ACETONIDE 1 MG/G
CREAM TOPICAL
Qty: 15 G | Refills: 0 | Status: SHIPPED | OUTPATIENT
Start: 2017-12-27 | End: 2018-02-07 | Stop reason: ALTCHOICE

## 2017-12-27 RX ORDER — HYDROCHLOROTHIAZIDE 12.5 MG/1
12.5 CAPSULE ORAL DAILY
Qty: 90 CAP | Refills: 0 | Status: SHIPPED | OUTPATIENT
Start: 2017-12-27 | End: 2018-02-07 | Stop reason: SDUPTHER

## 2017-12-27 NOTE — PATIENT INSTRUCTIONS
Today we made several changes. 1. I am concerned that the blood pressure medication containing lisinopril 20mg may have caused the lip/head swelling. That is why we need to stop the medication \"lisinopril 20mg/hctz 12.5mg.\"  2. I am adding back the HCTZ 12.5 mg. The capsule should be the $4 / $10 price. This does not cause the lip/head swelling. 3. I am adding amlodipine 5mg for blood pressure. 4. For the prostate and urine, there is a new medication: tamsulosin 0.4mg, 1 po qhs.  5. Itching feet: Eucerin Cream over the counter twice a day, rub in for 5 minutes. Only if itching, use prescription triamcinolone cream twice a day ($4). Please return in 1-2 weeks for labs. Please keep your appointment with Dr. Zuleyma Stephenson in January. I will discuss your case with her between now and then.

## 2017-12-27 NOTE — PROGRESS NOTES
Results for orders placed or performed in visit on 12/27/17   AMB POC GLUCOSE BLOOD, BY GLUCOSE MONITORING DEVICE   Result Value Ref Range    Glucose  fasting mg/dL   AMB POC HEMOGLOBIN (HGB)   Result Value Ref Range    Hemoglobin (POC) 10.1      Coordination of Care  1. Have you been to the ER, urgent care clinic since your last visit? Hospitalized since your last visit? No    2. Have you seen or consulted any other health care providers outside of the [address] [place] [address] since your last visit? Include any pap smears or colon screening. No    Medications  Does the patient need refills? NO    Learning Assessment Complete?  yes

## 2017-12-27 NOTE — PROGRESS NOTES
Patient and his son seen for discharge and no  needed. We reviewed the AVS, prescriptions, pharmacy location and the printed Good Rx coupons for Flomax and Amlodipine. We reviewed the provider's instruction to stop taking the lisinopril-Hctz and the instructions for the Eucerin cream and the Glimepiride. They understand to continue to check the patient's blood sugar and blood pressure at home and to bring the readings with them to the next provider appt. . The patient's son also stated that the patient's appetite seems to fluctuate recently. I gave him the Dental resources list of places to go for a dental exam and suggested that a modest increase in exercise may stimulate the appetite. We also discussed how the sense of taste and smell can change as people age and suggested that they try to vary the patient's diet to find things he enjoys eating. The patient's son asked for help for he and his wife to determine what foods the patient should eat. They agreed to schedule a dietician appointment.  Mandi Duarte RN

## 2017-12-27 NOTE — PROGRESS NOTES
Assessment/Plan:       ICD-10-CM ICD-9-CM    1. Controlled type 2 diabetes mellitus without complication, without long-term current use of insulin (HCC) E11.9 250.00 AMB POC GLUCOSE BLOOD, BY GLUCOSE MONITORING DEVICE   2. Screening for deficiency anemia Z13.0 V78.1 AMB POC HEMOGLOBIN (HGB)   Greater than 50% of this 25 minute visit was spent in face-to-face counseling/coordination of care regarding diabetes management. Counseling provided: we reviewed principles of hypoglycemia with him and his son; I had the son and the patient provide a teach-back of how to handle hypoglycemia, what do to, and why. The logistics of [facility] are confusing and complex to the family; they would prefer to buy the medicine locally but not at more than $50 a month. We will try one of the medications in [place] that by itself is not as expensive, due primarily        [address] [facility] [place] [address] [address] [address] [address]  Phone: [phone] Fax: [phone]    [facility] [id number] [place], [place] [address]y, [address] [place] [address] [address]  [address] [facility] [address] [place] [address]  Phone: [phone] Fax: [phone]      [address]  Subjective:     Chief Complaint   Patient presents with    Foot Pain     pt c/o pain and itchiness on both feet x3 months.  Shaking     pt c/o having shaking spells and is not hungry at all. Roxana Amaya is a 80 y.o. BLACK OR  male. He speaks [place], declines , adult son and his son is here to interpret. HPI:  At [place] he was feeling shaking. His family did not give him his diabetes meds yesterday evening or this morning. He is drinking a product given to him in [facility] to calm the shaking and swelling of the face. He was not able to tell me what it was.   \"Drinking\" after clarification meant taking a pill, the little white pill that was mentioned in Rick VALENCIA's RN visit note for which we do not know the name. The son showed me a picture of the dad with swollen lips. I am wondering if there is allergy to ACEI. Was not able to get bailee at [facility]. Will change to Tamsulosin for now. Glucoses: 12/10, 197; 12/14, 58; 12/15 64; 12/19, 194; 12/20, 324; 12/25, 224; 12/25 at 6 pm, 127; 12/25, 8:20pm, 145. Interpretation: he ate a small or no dinner the evening before the two fasting glucoses of 58 and 64. He had a large quantity of carbohydrates and did not know to check his glucose 15 minutes after 15g of carbohydrate; we reviewed these principles and I had the son provide a teach-back of how to handle hypoglycemia, what do to, and why. He  has a past medical history of Controlled type 2 diabetes mellitus without complication, without long-term current use of insulin ([address] (10/27/2017); Essential hypertension (10/27/2017); and Mixed hyperlipidemia (10/27/2017). He has Controlled type 2 diabetes mellitus without complication, without long-term current use of insulin ([address] Mixed hyperlipidemia, and Essential hypertension on his problem list.   Review of Systems: Negative for: fever, chest pain, shortness of breath, leg swelling, exertional dyspnea, palpitations. Current Medications:   Current Outpatient Prescriptions on File Prior to Visit   Medication Sig    aspirin 81 mg chewable tablet Take 100 mg by mouth daily. Pt brought in box of Aspirin that was 100 mg tablets.  VITAMIN B COMPLEX (B COMPLEX 1 PO) Take  by mouth two (2) times a day. With folic acid and vitamin C.    CALCIUM CITRATE/VITAMIN D3 (CALCIUM CITRATE + PO) Take  by mouth daily.  OTHER Pt brought in a bag of little white pills that he takes for his mouth when it is swollen as needed. [place] from [place] and is unlabeled. He took it 3 times last week.  metFORMIN (GLUCOPHAGE) 1,000 mg tablet Take 1 Tab by mouth two (2) times daily (with meals).     lisinopril-hydroCHLOROthiazide (PRINZIDE, ZESTORETIC) 20-12.5 mg per tablet Take 1 Tab by mouth daily.  glimepiride (AMARYL) 2 mg tablet Take 1 Tab by mouth every morning.  simvastatin (ZOCOR) 20 mg tablet Take 1 Tab by mouth nightly for 360 days. For cholesterol    Dutasteride-Tamsulosin (CALLUM) 0.5-0.4 mg CM24 Take 1 Tab by mouth daily. No current facility-administered medications on file prior to visit. Past Surgical History: He  has no past surgical history on file. Social and Family History: He  reports that he has quit smoking. He has never used smokeless tobacco. He reports that he does not drink alcohol or use illicit drugs. ; family history is not on file. Objective:     Vitals:    12/27/17 0953   BP: 172/69   Pulse: (!) 59   Temp: 97.7 °F (36.5 °C)   TempSrc: Oral   Weight: 113 lb (51.3 kg)   Height: 5' 3.39\" (1.61 m)    No LMP for male patient. Wt Readings from Last 2 Encounters:   12/27/17 113 lb (51.3 kg)   11/30/17 112 lb (50.8 kg)     Results for orders placed or performed in visit on 12/27/17   AMB POC GLUCOSE BLOOD, BY GLUCOSE MONITORING DEVICE   Result Value Ref Range    Glucose  fasting mg/dL   AMB POC HEMOGLOBIN (HGB)   Result Value Ref Range    Hemoglobin (POC) 10.1       Physical Examination:  General appearance - well developed, no acute distress. Chest - clear to auscultation. Heart - regular rate and rhythm without murmurs, rubs, or gallops. Abdomen - bowel sounds present x 4, NT, ND. Extremities - pulses intact. No peripheral edema. Assessment/Plan:   Diagnoses and all orders for this visit:    1. Controlled type 2 diabetes mellitus without complication, without long-term current use of insulin (HCC)  -     AMB POC GLUCOSE BLOOD, BY GLUCOSE MONITORING DEVICE    2. Screening for deficiency anemia  -     AMB POC HEMOGLOBIN (HGB)      Follow-up Disposition: Not on File   Renee Solares, DNP, FNP-BC, BC-ADM  Kurt Brooks expressed understanding of this plan.      Talk with Dr. Festus Lr about further working him up for all of these things including what labs. And talk about ruling out cancer? Reduced appetite although weight stable but bmi very low. I did not choose beta blocker due to HR 59. Renal function normal 11/2017, normocytic anemia (thal minor? Pt is from [place].   Need prostate exam?

## 2018-01-10 ENCOUNTER — TELEPHONE (OUTPATIENT)
Dept: FAMILY MEDICINE CLINIC | Age: 83
End: 2018-01-10

## 2018-01-10 ENCOUNTER — CLINICAL SUPPORT (OUTPATIENT)
Dept: FAMILY MEDICINE CLINIC | Age: 83
End: 2018-01-10

## 2018-01-10 DIAGNOSIS — Z71.9 COUNSELED BY NURSE: Primary | ICD-10-CM

## 2018-01-10 NOTE — PROGRESS NOTES
Patient came in for labs, however patient is not getting labs today patient is having symptoms of sever fatigue, and unable to go the bathroom these symptoms need  to be addressed by a provider not a nurse visit per Carlota Garg, therefore patient is coming to the clinic on Thursday to see a provider. Discussed this with the charge nurse Jillian Ruiz RN who will set the appointment up, patinet was also advised if the symptoms get worse tonight to go to the ED and not to wait.   Severo Hines MA

## 2018-01-10 NOTE — TELEPHONE ENCOUNTER
Chart reviewed. Call placed to didier crystal on [facility], no answer and message left. Call placed to sonGenevieve on [facility]. Discussed today and scheduled 9a appt tomorrow. Pt was observed to \"pass his urine today and is drinking fluids\" but is not eating much and not passing much stool. Reviewed ER for any emergency and appt for tomorrow.

## 2018-01-10 NOTE — PROGRESS NOTES
Per provider Hussein Valentine NP pt is  to come to [facility] clinic tomorrow to see a provider. Told to come to [facility] at [address] [place] [address] for medical visit w/ NP Benita Vasquez. Instructed to go to an ER for any emergency.

## 2018-01-11 ENCOUNTER — OFFICE VISIT (OUTPATIENT)
Dept: FAMILY MEDICINE CLINIC | Age: 83
End: 2018-01-11

## 2018-01-11 VITALS
WEIGHT: 106 LBS | TEMPERATURE: 96.5 F | SYSTOLIC BLOOD PRESSURE: 149 MMHG | HEART RATE: 87 BPM | DIASTOLIC BLOOD PRESSURE: 86 MMHG | BODY MASS INDEX: 18.55 KG/M2

## 2018-01-11 DIAGNOSIS — K59.00 CONSTIPATION, UNSPECIFIED CONSTIPATION TYPE: ICD-10-CM

## 2018-01-11 DIAGNOSIS — E11.9 CONTROLLED TYPE 2 DIABETES MELLITUS WITHOUT COMPLICATION, WITHOUT LONG-TERM CURRENT USE OF INSULIN (HCC): ICD-10-CM

## 2018-01-11 DIAGNOSIS — I10 ESSENTIAL HYPERTENSION: Primary | ICD-10-CM

## 2018-01-11 LAB — GLUCOSE POC: NORMAL MG/DL

## 2018-01-11 NOTE — PROGRESS NOTES
Subjective:     Chief Complaint   Patient presents with    Fatigue     weakness    Constipation        He  is a 80 y.o. male who presents for evaluation of fatigue and constipation. Pt is accompanied by his son who is interpreting per Pt request.     Onset 2-3 days ago and Pt notes he had a normal BM this AM.     Denies any unusual color/odor nor blood in stool this AM.     No fevers/chills, cough, sore throat nor ear pain. Has had a mixed appetite for the last week. Has been checking BPs at home, SBPs running between 150-200. Brought his pill bottles to visit today. (3 of Rx from med profile are missing, spoke w/ family at home). ROS  Gen - no fever/chills  Resp - no dyspnea or cough  CV - no chest pain or COYNE  Rest per HPI    Past Medical History:   Diagnosis Date    Controlled type 2 diabetes mellitus without complication, without long-term current use of insulin ([facility] 10/27/2017    Essential hypertension 10/27/2017    Mixed hyperlipidemia 10/27/2017     No past surgical history on file. Current Outpatient Prescriptions on File Prior to Visit   Medication Sig Dispense Refill    tamsulosin (FLOMAX) 0.4 mg capsule Take 1 Cap by mouth nightly. For prostate/urine. 30 Cap 2    amLODIPine (NORVASC) 5 mg tablet Take 1 Tab by mouth daily. For blood pressure. 90 Tab 0    hydroCHLOROthiazide (MICROZIDE) 12.5 mg capsule Take 1 Cap by mouth daily. 90 Cap 0    triamcinolone acetonide (KENALOG) 0.1 % topical cream Apply to tops of feet only if itchy twice a day, use thin layer; always use Eucerin cream twice a day 15 g 0    aspirin 81 mg chewable tablet Take 100 mg by mouth daily. Pt brought in box of Aspirin that was 100 mg tablets.  OTHER Pt brought in a bag of little white pills that he takes for his mouth when it is swollen as needed. [place] from [place] and is unlabeled. He took it 3 times last week.       metFORMIN (GLUCOPHAGE) 1,000 mg tablet Take 1 Tab by mouth two (2) times daily (with meals). 60 Tab 2    glimepiride (AMARYL) 2 mg tablet Take 1 Tab by mouth every morning. 30 Tab 2    simvastatin (ZOCOR) 20 mg tablet Take 1 Tab by mouth nightly for 360 days. For cholesterol 30 Tab 2     No current facility-administered medications on file prior to visit. Objective:     Vitals:    01/11/18 0946 01/11/18 0952   BP: 178/79 149/86   Pulse: 86 87   Temp: 96.5 °F (35.8 °C)    TempSrc: Axillary    Weight: 106 lb (48.1 kg)        Physical Examination:  General appearance - alert, well appearing, and in no distress  Eyes -sclera anicteric  Neck - supple, no significant adenopathy, no thyromegaly  Chest - clear to auscultation, no wheezes, rales or rhonchi, symmetric air entry  Heart - normal rate, regular rhythm, normal S1, S2, no murmurs, rubs, clicks or gallops  Neurological - alert, oriented, no focal findings or movement disorder noted  Abdomen-BS present/WNL x 4 quads, non-tender/distended, soft,no organomegaly    Recent Results (from the past 12 hour(s))   AMB POC GLUCOSE BLOOD, BY GLUCOSE MONITORING DEVICE    Collection Time: 01/11/18  9:51 AM   Result Value Ref Range    Glucose  fasting mg/dL         Assessment/ Plan:   Diagnoses and all orders for this visit:    1. Essential hypertension  -     METABOLIC PANEL, COMPREHENSIVE; Future    2. Controlled type 2 diabetes mellitus without complication, without long-term current use of insulin (HCC)  -     AMB POC GLUCOSE BLOOD, BY GLUCOSE MONITORING DEVICE  -     CBC WITH AUTOMATED DIFF; Future  -     METABOLIC PANEL, COMPREHENSIVE; Future  -     HEMOGLOBIN A1C WITH EAG; Future    3. Constipation, unspecified constipation type         Recommend OTC metamucil w/ an extra glass of water daily to prevent constipation. Continue to check BP and glucoses as previously done at home. ? HTN control, encouraged son to have ALL Rx brought to [facility] to confirm need for refills/compliance. Repeat baseline labs.      Has f/u appt next week w/ Dr. Jeanella Habermann. I have discussed the diagnosis with the patient and the intended plan as seen in the above orders. The patient has received an after-visit summary and questions were answered concerning future plans. I have discussed medication side effects and warnings with the patient as well. The patient verbalizes understanding and agreement with the plan. Follow-up Disposition:  Return in about 6 weeks (around 2/22/2018).

## 2018-01-11 NOTE — PROGRESS NOTES
Statements below were documented by Mike Steele discharged home with AVS and Medication teaching . No further questions. Encouraged to drink fluids prior to blood work on Monday .  Lei Gonzalez RN

## 2018-01-11 NOTE — PATIENT INSTRUCTIONS
Constipation: Care Instructions  Your Care Instructions    Constipation means that you have a hard time passing stools (bowel movements). People pass stools from 3 times a day to once every 3 days. What is normal for you may be different. Constipation may occur with pain in the rectum and cramping. The pain may get worse when you try to pass stools. Sometimes there are small amounts of bright red blood on toilet paper or the surface of stools. This is because of enlarged veins near the rectum (hemorrhoids). A few changes in your diet and lifestyle may help you avoid ongoing constipation. Your doctor may also prescribe medicine to help loosen your stool. Some medicines can cause constipation. These include pain medicines and antidepressants. Tell your doctor about all the medicines you take. Your doctor may want to make a medicine change to ease your symptoms. Follow-up care is a key part of your treatment and safety. Be sure to make and go to all appointments, and call your doctor if you are having problems. It's also a good idea to know your test results and keep a list of the medicines you take. How can you care for yourself at home? · Drink plenty of fluids, enough so that your urine is light yellow or clear like water. If you have kidney, heart, or liver disease and have to limit fluids, talk with your doctor before you increase the amount of fluids you drink. · Include high-fiber foods in your diet each day. These include fruits, vegetables, beans, and whole grains. · Get at least 30 minutes of exercise on most days of the week. Walking is a good choice. You also may want to do other activities, such as running, swimming, cycling, or playing tennis or team sports. · Take a fiber supplement, such as Citrucel or Metamucil, every day. Read and follow all instructions on the label. · Schedule time each day for a bowel movement. A daily routine may help.  Take your time having your bowel movement. · Support your feet with a small step stool when you sit on the toilet. This helps flex your hips and places your pelvis in a squatting position. · Your doctor may recommend an over-the-counter laxative to relieve your constipation. Examples are Milk of Magnesia and MiraLax. Read and follow all instructions on the label. Do not use laxatives on a long-term basis. When should you call for help? Call your doctor now or seek immediate medical care if:  ? · You have new or worse belly pain. ? · You have new or worse nausea or vomiting. ? · You have blood in your stools. ? Watch closely for changes in your health, and be sure to contact your doctor if:  ? · Your constipation is getting worse. ? · You do not get better as expected. Where can you learn more? Go to http://demond-felicia.info/. Enter 21 [phone] in the search box to learn more about \"Constipation: Care Instructions. \"  Current as of: March 20, 2017  Content Version: 11.4  © 2851-7184 [employer]. Care instructions adapted under license by [employer] (which disclaims liability or warranty for this information). If you have questions about a medical condition or this instruction, always ask your healthcare professional. [address] any warranty or liability for your use of this information.

## 2018-01-11 NOTE — PROGRESS NOTES
Coordination of Care  1. Have you been to the ER, urgent care clinic since your last visit? Hospitalized since your last visit? No    2. Have you seen or consulted any other health care providers outside of the [address] [place] [address] since your last visit? Include any pap smears or colon screening. No    Medications  Does the patient need refills?  NO    Learning Assessment Complete? yes    Results for orders placed or performed in visit on 01/11/18   AMB POC GLUCOSE BLOOD, BY GLUCOSE MONITORING DEVICE   Result Value Ref Range    Glucose  fasting mg/dL

## 2018-01-15 ENCOUNTER — HOSPITAL ENCOUNTER (OUTPATIENT)
Dept: LAB | Age: 83
Discharge: HOME | End: 2018-01-15

## 2018-01-15 ENCOUNTER — CLINICAL SUPPORT (OUTPATIENT)
Dept: FAMILY MEDICINE CLINIC | Age: 83
End: 2018-01-15

## 2018-01-15 DIAGNOSIS — E11.9 CONTROLLED TYPE 2 DIABETES MELLITUS WITHOUT COMPLICATION, WITHOUT LONG-TERM CURRENT USE OF INSULIN (HCC): ICD-10-CM

## 2018-01-15 DIAGNOSIS — I10 ESSENTIAL HYPERTENSION: ICD-10-CM

## 2018-01-15 LAB
ALBUMIN SERPL-MCNC: 4 G/DL (ref 3.5–5)
ALBUMIN/GLOB SERPL: 0.9 {RATIO} (ref 1.1–2.2)
ALP SERPL-CCNC: 51 U/L (ref 45–117)
ALT SERPL-CCNC: 12 U/L (ref 12–78)
ANION GAP SERPL CALC-SCNC: 4 MMOL/L (ref 5–15)
AST SERPL-CCNC: 9 U/L (ref 15–37)
BASOPHILS # BLD: 0 K/UL (ref 0–0.1)
BASOPHILS NFR BLD: 0 % (ref 0–1)
BILIRUB SERPL-MCNC: 0.5 MG/DL (ref 0.2–1)
BUN SERPL-MCNC: 17 MG/DL (ref 6–20)
BUN/CREAT SERPL: 13 (ref 12–20)
CALCIUM SERPL-MCNC: 9.2 MG/DL (ref 8.5–10.1)
CHLORIDE SERPL-SCNC: 93 MMOL/L (ref 97–108)
CO2 SERPL-SCNC: 34 MMOL/L (ref 21–32)
CREAT SERPL-MCNC: 1.31 MG/DL (ref 0.7–1.3)
EOSINOPHIL # BLD: 0.2 K/UL (ref 0–0.4)
EOSINOPHIL NFR BLD: 3 % (ref 0–7)
ERYTHROCYTE [DISTWIDTH] IN BLOOD BY AUTOMATED COUNT: 13.8 % (ref 11.5–14.5)
EST. AVERAGE GLUCOSE BLD GHB EST-MCNC: 197 MG/DL
GLOBULIN SER CALC-MCNC: 4.5 G/DL (ref 2–4)
GLUCOSE SERPL-MCNC: 253 MG/DL (ref 65–100)
HBA1C MFR BLD: 8.5 % (ref 4.2–6.3)
HCT VFR BLD AUTO: 36.4 % (ref 36.6–50.3)
HGB BLD-MCNC: 11.5 G/DL (ref 12.1–17)
LYMPHOCYTES # BLD: 2.1 K/UL (ref 0.8–3.5)
LYMPHOCYTES NFR BLD: 35 % (ref 12–49)
MCH RBC QN AUTO: 25.7 PG (ref 26–34)
MCHC RBC AUTO-ENTMCNC: 31.6 G/DL (ref 30–36.5)
MCV RBC AUTO: 81.4 FL (ref 80–99)
MONOCYTES # BLD: 0.4 K/UL (ref 0–1)
MONOCYTES NFR BLD: 7 % (ref 5–13)
NEUTS SEG # BLD: 3.3 K/UL (ref 1.8–8)
NEUTS SEG NFR BLD: 55 % (ref 32–75)
PLATELET # BLD AUTO: 349 K/UL (ref 150–400)
POTASSIUM SERPL-SCNC: 3.7 MMOL/L (ref 3.5–5.1)
PROT SERPL-MCNC: 8.5 G/DL (ref 6.4–8.2)
RBC # BLD AUTO: 4.47 M/UL (ref 4.1–5.7)
SODIUM SERPL-SCNC: 131 MMOL/L (ref 136–145)
WBC # BLD AUTO: 5.9 K/UL (ref 4.1–11.1)

## 2018-01-15 PROCEDURE — 80053 COMPREHEN METABOLIC PANEL: CPT | Performed by: NURSE PRACTITIONER

## 2018-01-15 PROCEDURE — 83036 HEMOGLOBIN GLYCOSYLATED A1C: CPT | Performed by: NURSE PRACTITIONER

## 2018-01-15 PROCEDURE — 85025 COMPLETE CBC W/AUTO DIFF WBC: CPT | Performed by: NURSE PRACTITIONER

## 2018-01-15 NOTE — PROGRESS NOTES
Patient presents for lab draw ordered by:LIZY Somers    The following labs were drawn Pretty Orantes LPN    CBC, CMP and [id number]    The following tubes were sent:  Gold  ( 1) and Lavender  ( 2)

## 2018-01-21 ENCOUNTER — APPOINTMENT (OUTPATIENT)
Dept: CT IMAGING | Age: 83
DRG: 305 | End: 2018-01-21
Attending: EMERGENCY MEDICINE
Payer: SUBSIDIZED

## 2018-01-21 ENCOUNTER — APPOINTMENT (OUTPATIENT)
Dept: GENERAL RADIOLOGY | Age: 83
DRG: 305 | End: 2018-01-21
Attending: EMERGENCY MEDICINE
Payer: SUBSIDIZED

## 2018-01-21 ENCOUNTER — HOSPITAL ENCOUNTER (INPATIENT)
Age: 83
LOS: 4 days | Discharge: HOME | DRG: 305 | End: 2018-01-25
Attending: EMERGENCY MEDICINE | Admitting: INTERNAL MEDICINE
Payer: SUBSIDIZED

## 2018-01-21 DIAGNOSIS — R41.82 ALTERED MENTAL STATUS, UNSPECIFIED ALTERED MENTAL STATUS TYPE: ICD-10-CM

## 2018-01-21 DIAGNOSIS — R50.9 FEVER, UNSPECIFIED FEVER CAUSE: Primary | ICD-10-CM

## 2018-01-21 PROBLEM — R41.0 ACUTE DELIRIUM: Status: ACTIVE | Noted: 2018-01-21

## 2018-01-21 LAB
ALBUMIN SERPL-MCNC: 2.9 G/DL (ref 3.5–5)
ALBUMIN SERPL-MCNC: 3.6 G/DL (ref 3.5–5)
ALBUMIN/GLOB SERPL: 0.7 {RATIO} (ref 1.1–2.2)
ALBUMIN/GLOB SERPL: 0.7 {RATIO} (ref 1.1–2.2)
ALP SERPL-CCNC: 41 U/L (ref 45–117)
ALP SERPL-CCNC: 53 U/L (ref 45–117)
ALT SERPL-CCNC: 12 U/L (ref 12–78)
ALT SERPL-CCNC: 13 U/L (ref 12–78)
AMMONIA PLAS-SCNC: 33 UMOL/L
AMYLASE SERPL-CCNC: 38 U/L (ref 25–115)
ANION GAP SERPL CALC-SCNC: 13 MMOL/L (ref 5–15)
ANION GAP SERPL CALC-SCNC: 6 MMOL/L (ref 5–15)
APPEARANCE UR: CLEAR
AST SERPL-CCNC: 16 U/L (ref 15–37)
AST SERPL-CCNC: 34 U/L (ref 15–37)
B PERT DNA SPEC QL NAA+PROBE: NOT DETECTED
BACTERIA URNS QL MICRO: NEGATIVE /HPF
BASOPHILS # BLD: 0 K/UL (ref 0–0.1)
BASOPHILS # BLD: 0 K/UL (ref 0–0.1)
BASOPHILS NFR BLD: 0 % (ref 0–1)
BASOPHILS NFR BLD: 0 % (ref 0–1)
BILIRUB SERPL-MCNC: 0.4 MG/DL (ref 0.2–1)
BILIRUB SERPL-MCNC: 0.4 MG/DL (ref 0.2–1)
BILIRUB UR QL: NEGATIVE
BUN SERPL-MCNC: 18 MG/DL (ref 6–20)
BUN SERPL-MCNC: 21 MG/DL (ref 6–20)
BUN/CREAT SERPL: 14 (ref 12–20)
BUN/CREAT SERPL: 14 (ref 12–20)
C PNEUM DNA SPEC QL NAA+PROBE: NOT DETECTED
CALCIUM SERPL-MCNC: 8.3 MG/DL (ref 8.5–10.1)
CALCIUM SERPL-MCNC: 9.3 MG/DL (ref 8.5–10.1)
CHLORIDE SERPL-SCNC: 100 MMOL/L (ref 97–108)
CHLORIDE SERPL-SCNC: 93 MMOL/L (ref 97–108)
CHOLEST SERPL-MCNC: 129 MG/DL
CK MB CFR SERPL CALC: 0.4 % (ref 0–2.5)
CK MB CFR SERPL CALC: 0.4 % (ref 0–2.5)
CK MB SERPL-MCNC: 1 NG/ML (ref 5–25)
CK MB SERPL-MCNC: 1.2 NG/ML (ref 5–25)
CK SERPL-CCNC: 232 U/L (ref 39–308)
CK SERPL-CCNC: 287 U/L (ref 39–308)
CO2 SERPL-SCNC: 27 MMOL/L (ref 21–32)
CO2 SERPL-SCNC: 27 MMOL/L (ref 21–32)
COLOR UR: ABNORMAL
CREAT SERPL-MCNC: 1.3 MG/DL (ref 0.7–1.3)
CREAT SERPL-MCNC: 1.49 MG/DL (ref 0.7–1.3)
EOSINOPHIL # BLD: 0 K/UL (ref 0–0.4)
EOSINOPHIL # BLD: 0 K/UL (ref 0–0.4)
EOSINOPHIL NFR BLD: 0 % (ref 0–7)
EOSINOPHIL NFR BLD: 0 % (ref 0–7)
EPITH CASTS URNS QL MICRO: ABNORMAL /LPF
ERYTHROCYTE [DISTWIDTH] IN BLOOD BY AUTOMATED COUNT: 14.5 % (ref 11.5–14.5)
ERYTHROCYTE [DISTWIDTH] IN BLOOD BY AUTOMATED COUNT: 15.1 % (ref 11.5–14.5)
FERRITIN SERPL-MCNC: 9 NG/ML (ref 26–388)
FLUAV AG NPH QL IA: NEGATIVE
FLUAV H1 2009 PAND RNA SPEC QL NAA+PROBE: NOT DETECTED
FLUAV H1 RNA SPEC QL NAA+PROBE: NOT DETECTED
FLUAV H3 RNA SPEC QL NAA+PROBE: NOT DETECTED
FLUAV SUBTYP SPEC NAA+PROBE: NOT DETECTED
FLUBV AG NOSE QL IA: NEGATIVE
FLUBV RNA SPEC QL NAA+PROBE: NOT DETECTED
FOLATE SERPL-MCNC: 26.5 NG/ML (ref 5–21)
GLOBULIN SER CALC-MCNC: 4.1 G/DL (ref 2–4)
GLOBULIN SER CALC-MCNC: 4.9 G/DL (ref 2–4)
GLUCOSE BLD STRIP.AUTO-MCNC: 200 MG/DL (ref 65–100)
GLUCOSE BLD STRIP.AUTO-MCNC: 207 MG/DL (ref 65–100)
GLUCOSE BLD STRIP.AUTO-MCNC: 244 MG/DL (ref 65–100)
GLUCOSE BLD STRIP.AUTO-MCNC: 299 MG/DL (ref 65–100)
GLUCOSE BLD STRIP.AUTO-MCNC: 389 MG/DL (ref 65–100)
GLUCOSE BLD STRIP.AUTO-MCNC: 54 MG/DL (ref 65–100)
GLUCOSE BLD STRIP.AUTO-MCNC: 65 MG/DL (ref 65–100)
GLUCOSE SERPL-MCNC: 259 MG/DL (ref 65–100)
GLUCOSE SERPL-MCNC: 335 MG/DL (ref 65–100)
GLUCOSE UR STRIP.AUTO-MCNC: >1000 MG/DL
HADV DNA SPEC QL NAA+PROBE: NOT DETECTED
HCOV 229E RNA SPEC QL NAA+PROBE: NOT DETECTED
HCOV HKU1 RNA SPEC QL NAA+PROBE: NOT DETECTED
HCOV NL63 RNA SPEC QL NAA+PROBE: NOT DETECTED
HCOV OC43 RNA SPEC QL NAA+PROBE: NOT DETECTED
HCT VFR BLD AUTO: 24.3 % (ref 36.6–50.3)
HCT VFR BLD AUTO: 25.9 % (ref 36.6–50.3)
HCT VFR BLD AUTO: 30.4 % (ref 36.6–50.3)
HDLC SERPL-MCNC: 70 MG/DL
HDLC SERPL: 1.8 {RATIO} (ref 0–5)
HGB BLD-MCNC: 7.8 G/DL (ref 12.1–17)
HGB BLD-MCNC: 8.2 G/DL (ref 12.1–17)
HGB BLD-MCNC: 9.6 G/DL (ref 12.1–17)
HGB UR QL STRIP: ABNORMAL
HMPV RNA SPEC QL NAA+PROBE: NOT DETECTED
HPIV1 RNA SPEC QL NAA+PROBE: NOT DETECTED
HPIV2 RNA SPEC QL NAA+PROBE: NOT DETECTED
HPIV3 RNA SPEC QL NAA+PROBE: NOT DETECTED
HPIV4 RNA SPEC QL NAA+PROBE: NOT DETECTED
IRON SATN MFR SERPL: 8 % (ref 20–50)
IRON SERPL-MCNC: 32 UG/DL (ref 35–150)
IRON SERPL-MCNC: 43 UG/DL (ref 35–150)
KETONES UR QL STRIP.AUTO: NEGATIVE MG/DL
LACTATE SERPL-SCNC: 1 MMOL/L (ref 0.4–2)
LACTATE SERPL-SCNC: 2.5 MMOL/L (ref 0.4–2)
LACTATE SERPL-SCNC: 5.1 MMOL/L (ref 0.4–2)
LDLC SERPL CALC-MCNC: 50.2 MG/DL (ref 0–100)
LEUKOCYTE ESTERASE UR QL STRIP.AUTO: NEGATIVE
LIPASE SERPL-CCNC: 161 U/L (ref 73–393)
LIPID PROFILE,FLP: NORMAL
LYMPHOCYTES # BLD: 1.2 K/UL (ref 0.8–3.5)
LYMPHOCYTES # BLD: 1.5 K/UL (ref 0.8–3.5)
LYMPHOCYTES NFR BLD: 16 % (ref 12–49)
LYMPHOCYTES NFR BLD: 18 % (ref 12–49)
M PNEUMO DNA SPEC QL NAA+PROBE: NOT DETECTED
MAGNESIUM SERPL-MCNC: 1.7 MG/DL (ref 1.6–2.4)
MCH RBC QN AUTO: 25.2 PG (ref 26–34)
MCH RBC QN AUTO: 25.3 PG (ref 26–34)
MCHC RBC AUTO-ENTMCNC: 31.6 G/DL (ref 30–36.5)
MCHC RBC AUTO-ENTMCNC: 31.7 G/DL (ref 30–36.5)
MCV RBC AUTO: 79.7 FL (ref 80–99)
MCV RBC AUTO: 80 FL (ref 80–99)
MONOCYTES # BLD: 0.4 K/UL (ref 0–1)
MONOCYTES # BLD: 0.5 K/UL (ref 0–1)
MONOCYTES NFR BLD: 5 % (ref 5–13)
MONOCYTES NFR BLD: 6 % (ref 5–13)
NEUTS SEG # BLD: 6 K/UL (ref 1.8–8)
NEUTS SEG # BLD: 6.3 K/UL (ref 1.8–8)
NEUTS SEG NFR BLD: 76 % (ref 32–75)
NEUTS SEG NFR BLD: 79 % (ref 32–75)
NITRITE UR QL STRIP.AUTO: NEGATIVE
PH UR STRIP: 7.5 [PH] (ref 5–8)
PHOSPHATE SERPL-MCNC: 3.1 MG/DL (ref 2.6–4.7)
PLATELET # BLD AUTO: 253 K/UL (ref 150–400)
PLATELET # BLD AUTO: 313 K/UL (ref 150–400)
POTASSIUM SERPL-SCNC: 4 MMOL/L (ref 3.5–5.1)
POTASSIUM SERPL-SCNC: 5.3 MMOL/L (ref 3.5–5.1)
PROT SERPL-MCNC: 7 G/DL (ref 6.4–8.2)
PROT SERPL-MCNC: 8.5 G/DL (ref 6.4–8.2)
PROT UR STRIP-MCNC: 100 MG/DL
RBC # BLD AUTO: 3.25 M/UL (ref 4.1–5.7)
RBC # BLD AUTO: 3.8 M/UL (ref 4.1–5.7)
RBC #/AREA URNS HPF: ABNORMAL /HPF (ref 0–5)
RSV RNA SPEC QL NAA+PROBE: NOT DETECTED
RV+EV RNA SPEC QL NAA+PROBE: NOT DETECTED
SERVICE CMNT-IMP: ABNORMAL
SERVICE CMNT-IMP: NORMAL
SODIUM SERPL-SCNC: 133 MMOL/L (ref 136–145)
SODIUM SERPL-SCNC: 133 MMOL/L (ref 136–145)
SP GR UR REFRACTOMETRY: 1.02 (ref 1–1.03)
TIBC SERPL-MCNC: 397 UG/DL (ref 250–450)
TRIGL SERPL-MCNC: 44 MG/DL (ref ?–150)
TROPONIN I SERPL-MCNC: 0.04 NG/ML
TROPONIN I SERPL-MCNC: <0.04 NG/ML
TROPONIN I SERPL-MCNC: <0.04 NG/ML
TSH SERPL DL<=0.05 MIU/L-ACNC: 1.7 UIU/ML (ref 0.36–3.74)
UR CULT HOLD, URHOLD: NORMAL
UROBILINOGEN UR QL STRIP.AUTO: 0.2 EU/DL (ref 0.2–1)
VIT B12 SERPL-MCNC: 1479 PG/ML (ref 211–911)
VLDLC SERPL CALC-MCNC: 8.8 MG/DL
WBC # BLD AUTO: 7.6 K/UL (ref 4.1–11.1)
WBC # BLD AUTO: 8.3 K/UL (ref 4.1–11.1)
WBC URNS QL MICRO: ABNORMAL /HPF (ref 0–4)

## 2018-01-21 PROCEDURE — 99285 EMERGENCY DEPT VISIT HI MDM: CPT

## 2018-01-21 PROCEDURE — 71045 X-RAY EXAM CHEST 1 VIEW: CPT

## 2018-01-21 PROCEDURE — 82607 VITAMIN B-12: CPT | Performed by: INTERNAL MEDICINE

## 2018-01-21 PROCEDURE — 74011250636 HC RX REV CODE- 250/636: Performed by: EMERGENCY MEDICINE

## 2018-01-21 PROCEDURE — 87804 INFLUENZA ASSAY W/OPTIC: CPT | Performed by: EMERGENCY MEDICINE

## 2018-01-21 PROCEDURE — 82150 ASSAY OF AMYLASE: CPT | Performed by: INTERNAL MEDICINE

## 2018-01-21 PROCEDURE — 94761 N-INVAS EAR/PLS OXIMETRY MLT: CPT

## 2018-01-21 PROCEDURE — 65660000001 HC RM ICU INTERMED STEPDOWN

## 2018-01-21 PROCEDURE — 74011250636 HC RX REV CODE- 250/636: Performed by: INTERNAL MEDICINE

## 2018-01-21 PROCEDURE — 83690 ASSAY OF LIPASE: CPT | Performed by: INTERNAL MEDICINE

## 2018-01-21 PROCEDURE — 74011250637 HC RX REV CODE- 250/637: Performed by: INTERNAL MEDICINE

## 2018-01-21 PROCEDURE — 82746 ASSAY OF FOLIC ACID SERUM: CPT | Performed by: INTERNAL MEDICINE

## 2018-01-21 PROCEDURE — 83540 ASSAY OF IRON: CPT | Performed by: INTERNAL MEDICINE

## 2018-01-21 PROCEDURE — 84443 ASSAY THYROID STIM HORMONE: CPT | Performed by: INTERNAL MEDICINE

## 2018-01-21 PROCEDURE — 74011636320 HC RX REV CODE- 636/320: Performed by: EMERGENCY MEDICINE

## 2018-01-21 PROCEDURE — 96375 TX/PRO/DX INJ NEW DRUG ADDON: CPT

## 2018-01-21 PROCEDURE — 83735 ASSAY OF MAGNESIUM: CPT | Performed by: INTERNAL MEDICINE

## 2018-01-21 PROCEDURE — 85025 COMPLETE CBC W/AUTO DIFF WBC: CPT | Performed by: EMERGENCY MEDICINE

## 2018-01-21 PROCEDURE — 96361 HYDRATE IV INFUSION ADD-ON: CPT

## 2018-01-21 PROCEDURE — 82550 ASSAY OF CK (CPK): CPT | Performed by: INTERNAL MEDICINE

## 2018-01-21 PROCEDURE — 87086 URINE CULTURE/COLONY COUNT: CPT | Performed by: INTERNAL MEDICINE

## 2018-01-21 PROCEDURE — 70450 CT HEAD/BRAIN W/O DYE: CPT

## 2018-01-21 PROCEDURE — 82140 ASSAY OF AMMONIA: CPT | Performed by: INTERNAL MEDICINE

## 2018-01-21 PROCEDURE — 83605 ASSAY OF LACTIC ACID: CPT | Performed by: INTERNAL MEDICINE

## 2018-01-21 PROCEDURE — 80061 LIPID PANEL: CPT | Performed by: INTERNAL MEDICINE

## 2018-01-21 PROCEDURE — 74011000258 HC RX REV CODE- 258: Performed by: EMERGENCY MEDICINE

## 2018-01-21 PROCEDURE — 87040 BLOOD CULTURE FOR BACTERIA: CPT | Performed by: EMERGENCY MEDICINE

## 2018-01-21 PROCEDURE — 74177 CT ABD & PELVIS W/CONTRAST: CPT

## 2018-01-21 PROCEDURE — 83605 ASSAY OF LACTIC ACID: CPT | Performed by: EMERGENCY MEDICINE

## 2018-01-21 PROCEDURE — 81001 URINALYSIS AUTO W/SCOPE: CPT | Performed by: EMERGENCY MEDICINE

## 2018-01-21 PROCEDURE — 96365 THER/PROPH/DIAG IV INF INIT: CPT

## 2018-01-21 PROCEDURE — 82728 ASSAY OF FERRITIN: CPT | Performed by: INTERNAL MEDICINE

## 2018-01-21 PROCEDURE — 87798 DETECT AGENT NOS DNA AMP: CPT | Performed by: INTERNAL MEDICINE

## 2018-01-21 PROCEDURE — 84100 ASSAY OF PHOSPHORUS: CPT | Performed by: INTERNAL MEDICINE

## 2018-01-21 PROCEDURE — 93005 ELECTROCARDIOGRAM TRACING: CPT

## 2018-01-21 PROCEDURE — 80053 COMPREHEN METABOLIC PANEL: CPT | Performed by: INTERNAL MEDICINE

## 2018-01-21 PROCEDURE — 85018 HEMOGLOBIN: CPT | Performed by: INTERNAL MEDICINE

## 2018-01-21 PROCEDURE — 74011250637 HC RX REV CODE- 250/637: Performed by: EMERGENCY MEDICINE

## 2018-01-21 PROCEDURE — 80053 COMPREHEN METABOLIC PANEL: CPT | Performed by: EMERGENCY MEDICINE

## 2018-01-21 PROCEDURE — 36415 COLL VENOUS BLD VENIPUNCTURE: CPT | Performed by: EMERGENCY MEDICINE

## 2018-01-21 PROCEDURE — 74011636637 HC RX REV CODE- 636/637: Performed by: INTERNAL MEDICINE

## 2018-01-21 PROCEDURE — 84484 ASSAY OF TROPONIN QUANT: CPT | Performed by: INTERNAL MEDICINE

## 2018-01-21 PROCEDURE — 74011000258 HC RX REV CODE- 258: Performed by: INTERNAL MEDICINE

## 2018-01-21 PROCEDURE — 74011000250 HC RX REV CODE- 250: Performed by: INTERNAL MEDICINE

## 2018-01-21 PROCEDURE — 82962 GLUCOSE BLOOD TEST: CPT

## 2018-01-21 RX ORDER — HEPARIN SODIUM 5000 [USP'U]/ML
5000 INJECTION, SOLUTION INTRAVENOUS; SUBCUTANEOUS EVERY 8 HOURS
Status: DISCONTINUED | OUTPATIENT
Start: 2018-01-21 | End: 2018-01-25 | Stop reason: HOSPADM

## 2018-01-21 RX ORDER — VANCOMYCIN/0.9 % SOD CHLORIDE 1 G/100 ML
1000 PLASTIC BAG, INJECTION (ML) INTRAVENOUS ONCE
Status: DISCONTINUED | OUTPATIENT
Start: 2018-01-21 | End: 2018-01-21 | Stop reason: CLARIF

## 2018-01-21 RX ORDER — SODIUM CHLORIDE 9 MG/ML
75 INJECTION, SOLUTION INTRAVENOUS CONTINUOUS
Status: DISCONTINUED | OUTPATIENT
Start: 2018-01-21 | End: 2018-01-25 | Stop reason: HOSPADM

## 2018-01-21 RX ORDER — LORAZEPAM 2 MG/ML
0.5 INJECTION INTRAMUSCULAR
Status: COMPLETED | OUTPATIENT
Start: 2018-01-21 | End: 2018-01-21

## 2018-01-21 RX ORDER — VANCOMYCIN/0.9 % SOD CHLORIDE 1 G/100 ML
1000 PLASTIC BAG, INJECTION (ML) INTRAVENOUS EVERY 24 HOURS
Status: DISCONTINUED | OUTPATIENT
Start: 2018-01-22 | End: 2018-01-24

## 2018-01-21 RX ORDER — LEVOFLOXACIN 5 MG/ML
750 INJECTION, SOLUTION INTRAVENOUS
Status: COMPLETED | OUTPATIENT
Start: 2018-01-21 | End: 2018-01-21

## 2018-01-21 RX ORDER — LEVOFLOXACIN 5 MG/ML
INJECTION, SOLUTION INTRAVENOUS
Status: DISPENSED
Start: 2018-01-21 | End: 2018-01-21

## 2018-01-21 RX ORDER — LORAZEPAM 2 MG/ML
INJECTION INTRAMUSCULAR
Status: DISPENSED
Start: 2018-01-21 | End: 2018-01-21

## 2018-01-21 RX ORDER — LANOLIN ALCOHOL/MO/W.PET/CERES
1 CREAM (GRAM) TOPICAL
Status: DISCONTINUED | OUTPATIENT
Start: 2018-01-21 | End: 2018-01-25 | Stop reason: HOSPADM

## 2018-01-21 RX ORDER — ASPIRIN 81 MG/1
81 TABLET ORAL DAILY
COMMUNITY

## 2018-01-21 RX ORDER — MAGNESIUM SULFATE 100 %
4 CRYSTALS MISCELLANEOUS AS NEEDED
Status: DISCONTINUED | OUTPATIENT
Start: 2018-01-21 | End: 2018-01-25 | Stop reason: HOSPADM

## 2018-01-21 RX ORDER — ACETAMINOPHEN 650 MG/1
650 SUPPOSITORY RECTAL
Status: DISCONTINUED | OUTPATIENT
Start: 2018-01-21 | End: 2018-01-25 | Stop reason: HOSPADM

## 2018-01-21 RX ORDER — ASPIRIN 81 MG/1
81 TABLET ORAL DAILY
Status: DISCONTINUED | OUTPATIENT
Start: 2018-01-21 | End: 2018-01-25 | Stop reason: HOSPADM

## 2018-01-21 RX ORDER — SODIUM CHLORIDE 0.9 % (FLUSH) 0.9 %
5-10 SYRINGE (ML) INJECTION EVERY 8 HOURS
Status: DISCONTINUED | OUTPATIENT
Start: 2018-01-21 | End: 2018-01-25 | Stop reason: HOSPADM

## 2018-01-21 RX ORDER — SODIUM CHLORIDE 0.9 % (FLUSH) 0.9 %
10 SYRINGE (ML) INJECTION
Status: COMPLETED | OUTPATIENT
Start: 2018-01-21 | End: 2018-01-21

## 2018-01-21 RX ORDER — AMLODIPINE BESYLATE 5 MG/1
5 TABLET ORAL DAILY
Status: DISCONTINUED | OUTPATIENT
Start: 2018-01-21 | End: 2018-01-23

## 2018-01-21 RX ORDER — DEXTROSE 50 % IN WATER (D50W) INTRAVENOUS SYRINGE
12.5-25 AS NEEDED
Status: DISCONTINUED | OUTPATIENT
Start: 2018-01-21 | End: 2018-01-25 | Stop reason: HOSPADM

## 2018-01-21 RX ORDER — ACETAMINOPHEN 650 MG/1
650 SUPPOSITORY RECTAL
Status: COMPLETED | OUTPATIENT
Start: 2018-01-21 | End: 2018-01-21

## 2018-01-21 RX ORDER — SIMVASTATIN 20 MG/1
20 TABLET, FILM COATED ORAL
Status: DISCONTINUED | OUTPATIENT
Start: 2018-01-21 | End: 2018-01-25 | Stop reason: HOSPADM

## 2018-01-21 RX ORDER — ACETAMINOPHEN 325 MG/1
650 TABLET ORAL
Status: DISCONTINUED | OUTPATIENT
Start: 2018-01-21 | End: 2018-01-25 | Stop reason: HOSPADM

## 2018-01-21 RX ORDER — INSULIN LISPRO 100 [IU]/ML
INJECTION, SOLUTION INTRAVENOUS; SUBCUTANEOUS
Status: DISCONTINUED | OUTPATIENT
Start: 2018-01-21 | End: 2018-01-25 | Stop reason: HOSPADM

## 2018-01-21 RX ORDER — SODIUM CHLORIDE 0.9 % (FLUSH) 0.9 %
5-10 SYRINGE (ML) INJECTION AS NEEDED
Status: DISCONTINUED | OUTPATIENT
Start: 2018-01-21 | End: 2018-01-25 | Stop reason: HOSPADM

## 2018-01-21 RX ORDER — GUAIFENESIN 100 MG/5ML
100 LIQUID (ML) ORAL DAILY
Status: DISCONTINUED | OUTPATIENT
Start: 2018-01-21 | End: 2018-01-21 | Stop reason: DRUGHIGH

## 2018-01-21 RX ORDER — DOCUSATE SODIUM 100 MG/1
100 CAPSULE, LIQUID FILLED ORAL 2 TIMES DAILY
Status: DISCONTINUED | OUTPATIENT
Start: 2018-01-21 | End: 2018-01-25 | Stop reason: HOSPADM

## 2018-01-21 RX ORDER — ONDANSETRON 2 MG/ML
4 INJECTION INTRAMUSCULAR; INTRAVENOUS
Status: DISCONTINUED | OUTPATIENT
Start: 2018-01-21 | End: 2018-01-25 | Stop reason: HOSPADM

## 2018-01-21 RX ORDER — TAMSULOSIN HYDROCHLORIDE 0.4 MG/1
0.4 CAPSULE ORAL
Status: DISCONTINUED | OUTPATIENT
Start: 2018-01-21 | End: 2018-01-25 | Stop reason: HOSPADM

## 2018-01-21 RX ORDER — TRIAMCINOLONE ACETONIDE 1 MG/G
CREAM TOPICAL 2 TIMES DAILY
Status: DISCONTINUED | OUTPATIENT
Start: 2018-01-21 | End: 2018-01-25 | Stop reason: HOSPADM

## 2018-01-21 RX ORDER — VANCOMYCIN 2 GRAM/500 ML IN 0.9 % SODIUM CHLORIDE INTRAVENOUS
2000
Status: DISCONTINUED | OUTPATIENT
Start: 2018-01-21 | End: 2018-01-21 | Stop reason: DRUGHIGH

## 2018-01-21 RX ADMIN — DEXTROSE MONOHYDRATE 25 G: 500 INJECTION PARENTERAL at 11:21

## 2018-01-21 RX ADMIN — SODIUM CHLORIDE 125 ML/HR: 900 INJECTION, SOLUTION INTRAVENOUS at 10:18

## 2018-01-21 RX ADMIN — TAMSULOSIN HYDROCHLORIDE 0.4 MG: 0.4 CAPSULE ORAL at 20:11

## 2018-01-21 RX ADMIN — SODIUM CHLORIDE 125 ML/HR: 900 INJECTION, SOLUTION INTRAVENOUS at 01:52

## 2018-01-21 RX ADMIN — INSULIN LISPRO 5 UNITS: 100 INJECTION, SOLUTION INTRAVENOUS; SUBCUTANEOUS at 07:21

## 2018-01-21 RX ADMIN — Medication 10 ML: at 06:15

## 2018-01-21 RX ADMIN — PIPERACILLIN SODIUM,TAZOBACTAM SODIUM 3.38 G: 3; .375 INJECTION, POWDER, FOR SOLUTION INTRAVENOUS at 03:16

## 2018-01-21 RX ADMIN — SODIUM CHLORIDE 100 ML: 900 INJECTION, SOLUTION INTRAVENOUS at 02:42

## 2018-01-21 RX ADMIN — SODIUM CHLORIDE 125 ML/HR: 900 INJECTION, SOLUTION INTRAVENOUS at 19:23

## 2018-01-21 RX ADMIN — ACETAMINOPHEN 650 MG: 650 SUPPOSITORY RECTAL at 00:42

## 2018-01-21 RX ADMIN — PIPERACILLIN AND TAZOBACTAM 10.12 G: 3; .375 INJECTION, POWDER, FOR SOLUTION INTRAVENOUS at 11:23

## 2018-01-21 RX ADMIN — Medication 10 ML: at 20:16

## 2018-01-21 RX ADMIN — SODIUM CHLORIDE 125 ML/HR: 900 INJECTION, SOLUTION INTRAVENOUS at 03:47

## 2018-01-21 RX ADMIN — PIPERACILLIN SODIUM,TAZOBACTAM SODIUM 3.38 G: 3; .375 INJECTION, POWDER, FOR SOLUTION INTRAVENOUS at 10:30

## 2018-01-21 RX ADMIN — HEPARIN SODIUM 5000 UNITS: 5000 INJECTION, SOLUTION INTRAVENOUS; SUBCUTANEOUS at 06:09

## 2018-01-21 RX ADMIN — IOPAMIDOL 100 ML: 755 INJECTION, SOLUTION INTRAVENOUS at 02:42

## 2018-01-21 RX ADMIN — ACETAMINOPHEN 650 MG: 650 SUPPOSITORY RECTAL at 07:21

## 2018-01-21 RX ADMIN — AMLODIPINE BESYLATE 5 MG: 5 TABLET ORAL at 17:36

## 2018-01-21 RX ADMIN — DOCUSATE SODIUM 100 MG: 100 CAPSULE, LIQUID FILLED ORAL at 17:35

## 2018-01-21 RX ADMIN — Medication 10 ML: at 02:42

## 2018-01-21 RX ADMIN — ASPIRIN 81 MG: 81 TABLET, COATED ORAL at 17:35

## 2018-01-21 RX ADMIN — LEVOFLOXACIN 750 MG: 5 INJECTION, SOLUTION INTRAVENOUS at 01:30

## 2018-01-21 RX ADMIN — SODIUM CHLORIDE 1000 ML: 900 INJECTION, SOLUTION INTRAVENOUS at 00:18

## 2018-01-21 RX ADMIN — SIMVASTATIN 20 MG: 20 TABLET, FILM COATED ORAL at 20:11

## 2018-01-21 RX ADMIN — Medication 10 ML: at 13:01

## 2018-01-21 RX ADMIN — HEPARIN SODIUM 5000 UNITS: 5000 INJECTION, SOLUTION INTRAVENOUS; SUBCUTANEOUS at 13:00

## 2018-01-21 RX ADMIN — VANCOMYCIN HYDROCHLORIDE 2000 MG: 10 INJECTION, POWDER, LYOPHILIZED, FOR SOLUTION INTRAVENOUS at 03:48

## 2018-01-21 RX ADMIN — LORAZEPAM 0.5 MG: 2 INJECTION, SOLUTION INTRAMUSCULAR; INTRAVENOUS at 00:19

## 2018-01-21 RX ADMIN — MEPERIDINE HYDROCHLORIDE 25 MG: 25 INJECTION INTRAMUSCULAR; INTRAVENOUS; SUBCUTANEOUS at 03:19

## 2018-01-21 RX ADMIN — LORAZEPAM 0.5 MG: 2 INJECTION, SOLUTION INTRAMUSCULAR; INTRAVENOUS at 01:54

## 2018-01-21 RX ADMIN — INSULIN LISPRO 3 UNITS: 100 INJECTION, SOLUTION INTRAVENOUS; SUBCUTANEOUS at 20:22

## 2018-01-21 RX ADMIN — HEPARIN SODIUM 5000 UNITS: 5000 INJECTION, SOLUTION INTRAVENOUS; SUBCUTANEOUS at 20:19

## 2018-01-21 NOTE — IP AVS SNAPSHOT
[address] [facility] [address] 
[phone] Patient: Lesia Lopez MRN: [id number] OND:[birth year] A check pamela indicates which time of day the medication should be taken. My Medications START taking these medications Instructions Each Dose to Equal  
 Morning Noon Evening Bedtime  
 atenolol 50 mg tablet Commonly known as:  TENORMIN Start taking on:  1/26/2018 Your last dose was: Your next dose is: Take 1 Tab by mouth daily. 50 mg  
    
   
   
   
  
 hydrALAZINE 10 mg tablet Commonly known as:  APRESOLINE Your last dose was: Your next dose is: Take 1 Tab by mouth three (3) times daily. 10 mg CHANGE how you take these medications Instructions Each Dose to Equal  
 Morning Noon Evening Bedtime  
 amLODIPine 10 mg tablet Commonly known as:  Annalisa Hair Start taking on:  1/26/2018 What changed:   
- medication strength 
- how much to take 
- additional instructions Your last dose was: Your next dose is: Take 1 Tab by mouth daily. 10 mg CONTINUE taking these medications Instructions Each Dose to Equal  
 Morning Noon Evening Bedtime  
 aspirin delayed-release 81 mg tablet Your last dose was: Your next dose is: Take 81 mg by mouth daily. 81 mg  
    
   
   
   
  
 glimepiride 2 mg tablet Commonly known as:  AMARYL Your last dose was: Your next dose is: Take 1 Tab by mouth every morning. 2 mg  
    
   
   
   
  
 hydroCHLOROthiazide 12.5 mg capsule Commonly known as:  Arleta Rife Your last dose was: Your next dose is: Take 1 Cap by mouth daily. 12.5 mg  
    
   
   
   
  
 METAMUCIL 3.4 gram/5.4 gram Powd Generic drug:  psyllium husk Your last dose was: Your next dose is: Take 5.4 g by mouth daily. W/ glass of water. Drink another glass of water after taking fiber. 5.4 g  
    
   
   
   
  
 metFORMIN 1,000 mg tablet Commonly known as:  GLUCOPHAGE Your last dose was: Your next dose is: Take 1 Tab by mouth two (2) times daily (with meals). 1000 mg  
    
   
   
   
  
 simvastatin 20 mg tablet Commonly known as:  ZOCOR Your last dose was: Your next dose is: Take 1 Tab by mouth nightly for 360 days. For cholesterol 20 mg  
    
   
   
   
  
 tamsulosin 0.4 mg capsule Commonly known as:  FLOMAX Your last dose was: Your next dose is: Take 1 Cap by mouth nightly. For prostate/urine. 0.4 mg  
    
   
   
   
  
 triamcinolone acetonide 0.1 % topical cream  
Commonly known as:  KENALOG Your last dose was: Your next dose is:    
   
   
 Apply to tops of feet only if itchy twice a day, use thin layer; always use Eucerin cream twice a day Where to Get Your Medications These medications were sent to [facility]  [address] [place] [address] Phone:  [phone]  
  amLODIPine 10 mg tablet  
 atenolol 50 mg tablet  
 hydrALAZINE 10 mg tablet

## 2018-01-21 NOTE — PROGRESS NOTES
Bedside and Verbal shift change report given to Sarah Hankins RN (oncoming nurse) by Liat Angel (offgoing nurse). Report included the following information SBAR, Kardex, ED Summary, MAR, Accordion, Recent Results, Med Rec Status and Cardiac Rhythm NSR.

## 2018-01-21 NOTE — PROGRESS NOTES
Hospitalist Progress Note  Shonda Saleh MD  Office: [phone]      Date of Service:  2018  NAME:  Bessy Victoria  :  1934  MRN:  [id number]      Admission Summary:   History taken from his son at the bed side, Patient is not communicative. This is 80 y.o female with medical history of type II DM, BPH, hypertension, and Hyperlipidemia presented to the hospital with a chief complaint of AMS and fever of one day duration. Denied of having cough chills and rigor prior to this incident. Labs reviewed with significantly elevated lactate which trended down. Interval history / Subjective:   Patient seen at the bed side, still confused and pulling lines, non communicative. Assessment & Plan:       AMS secondary to Sepsis syndrome of ?  Viral in origin (URI) VS Urinary tract in origin   Tachycardic, febrile, and AMS     CT head and abdomen: no acute finding   Influenza antigen negative   Will send for viral PCR   Continue with IV fluids   Continue with Levaquin, Zosyn and Vancomycin   Follow up CBC, BMP, blood and urine culture   Tylenol on PRN bases   Trend fever and WBC count     Microcytic anemia   Iron studies more looks consistent with iron deficiency anemia   Will start patient on Fe So4   Follow H and H every 12 hours   Transfuse if hgb is < 7 gm     History of BPH   Continue with home medication of Tamsulosin     Hypertension   Hold antihypertensive for now   Will add on the bases of vital signs     Type II DM   AC HS coverage   Continue with SSI   Hold oral hypoglycemic for now     Code status: Full Code   DVT prophylaxis: on Heparin (watch for significant drop     Care Plan discussed with: Patient/Family and Nurse  Disposition: TBD     Hospital Problems  Date Reviewed: 2018          Codes Class Noted POA    * (Principal)Acute delirium ICD-10-CM: R41.0  ICD-9-CM: 780.09  2018 Yes                Review of Systems: A comprehensive review of systems was negative except for that written in the HPI. Vital Signs:    Last 24hrs VS reviewed since prior progress note. Most recent are:  Visit Vitals    /51    Pulse 77    Temp (!) 100.7 °F (38.2 °C)    Resp 17    Ht 5' (1.524 m)    Wt 49.4 kg (109 lb)    SpO2 96%    BMI 21.29 kg/m2       No intake or output data in the 24 hours ending 01/21/18 3996     Physical Examination:             Constitutional:  confused, no distress    ENT:  Oral mucous moist, oropharynx benign. Neck supple,    Resp: Clear and resonant chest anteriorly    CV:  Tachycardic rate in the 110     GI:  Soft, non distended, non tender. normoactive bowel sounds, no hepatosplenomegaly     Musculoskeletal:  No edema, warm, 2+ pulses throughout    Neurologic:  Moves all extremities. Alert but confused      Psych:  confused and not oriented to place and person        Data Review:    Review and/or order of clinical lab test  Review and/or order of tests in the medicine section of [facility]      Labs:     Recent Labs      01/21/18   0602  01/21/18   0017   WBC  7.6  8.3   HGB  8.2*  9.6*   HCT  25.9*  30.4*   PLT  253  313     Recent Labs      01/21/18   0602  01/21/18   0017   NA  133*  133*   K  5.3*  4.0   CL  100  93*   CO2  27  27   BUN  18  21*   CREA  1.30  1.49*   GLU  259*  335*   CA  8.3*  9.3   MG  1.7   --    PHOS  3.1   --      Recent Labs      01/21/18   0602  01/21/18   0017   SGOT  34  16   ALT  13  12   AP  41*  53   TBILI  0.4  0.4   TP  7.0  8.5*   ALB  2.9*  3.6   GLOB  4.1*  4.9*   AML  38   --    LPSE  161   --      No results for input(s): INR, PTP, APTT in the last 72 hours. No lab exists for component: INREXT   Recent Labs      01/21/18   0530   TIBC  397   PSAT  8*   FERR  9*      No results found for: FOL, RBCF   No results for input(s): PH, PCO2, PO2 in the last 72 hours.   Recent Labs      01/21/18   0602  01/21/18   0530   CPK  287   --    CKNDX  0.4   --    TROIQ  <0.04 <0.04     Lab Results   Component Value Date/Time    Cholesterol, total 129 01/21/2018 06:02 AM    HDL Cholesterol 70 01/21/2018 06:02 AM    LDL, calculated 50.2 01/21/2018 06:02 AM    Triglyceride 44 01/21/2018 06:02 AM    CHOL/HDL Ratio 1.8 01/21/2018 06:02 AM     Lab Results   Component Value Date/Time    Glucose (POC) 299 01/21/2018 07:02 AM    Glucose (POC) 389 01/21/2018 12:10 AM    Glucose  fasting 01/11/2018 09:51 AM    Glucose  fasting 12/27/2017 10:08 AM    Glucose  NF 11/30/2017 10:10 AM    Glucose  fasting 11/08/2017 11:33 AM    Glucose  10/27/2017 02:02 PM     Lab Results   Component Value Date/Time    Color YELLOW/STRAW 01/21/2018 12:37 AM    Appearance CLEAR 01/21/2018 12:37 AM    Specific gravity 1.016 01/21/2018 12:37 AM    pH (UA) 7.5 01/21/2018 12:37 AM    Protein 100 01/21/2018 12:37 AM    Glucose >1000 01/21/2018 12:37 AM    Ketone NEGATIVE  01/21/2018 12:37 AM    Bilirubin NEGATIVE  01/21/2018 12:37 AM    Urobilinogen 0.2 01/21/2018 12:37 AM    Nitrites NEGATIVE  01/21/2018 12:37 AM    Leukocyte Esterase NEGATIVE  01/21/2018 12:37 AM    Epithelial cells FEW 01/21/2018 12:37 AM    Bacteria NEGATIVE  01/21/2018 12:37 AM    WBC 0-4 01/21/2018 12:37 AM    RBC 0-5 01/21/2018 12:37 AM         Medications Reviewed:     Current Facility-Administered Medications   Medication Dose Route Frequency    0.9% sodium chloride infusion  125 mL/hr IntraVENous CONTINUOUS    sodium chloride (NS) flush 5-10 mL  5-10 mL IntraVENous Q8H    sodium chloride (NS) flush 5-10 mL  5-10 mL IntraVENous PRN    ondansetron (ZOFRAN) injection 4 mg  4 mg IntraVENous Q4H PRN    docusate sodium (COLACE) capsule 100 mg  100 mg Oral BID    heparin (porcine) injection 5,000 Units  5,000 Units SubCUTAneous Q8H    lactobac ac& pc-s.therm-b.anim (ROSARIO Q/RISAQUAD)  1 Cap Oral DAILY    insulin lispro (HUMALOG) injection   SubCUTAneous AC&HS    glucose chewable tablet 16 g  4 Tab Oral PRN    dextrose (D50W) injection syrg 12.5-25 g  12.5-25 g IntraVENous PRN    glucagon (GLUCAGEN) injection 1 mg  1 mg IntraMUSCular PRN    Vancomycin Pharmacy to Dose   Other Rx Dosing/Monitoring    piperacillin-tazobactam (ZOSYN) 3.375 g in 0.9% sodium chloride (MBP/ADV) 100 mL  3.375 g IntraVENous ONCE    And    piperacillin-tazobactam (ZOSYN) 10.125 g in  mL continuous 24 hr infusion  10.125 g IntraVENous Q24H    [START ON 1/22/2018] vancomycin (VANCOCIN) 750 mg in  ml infusion  750 mg IntraVENous Q24H    acetaminophen (TYLENOL) suppository 650 mg  650 mg Rectal Q4H PRN     Current Outpatient Prescriptions   Medication Sig    psyllium husk (METAMUCIL) 3.4 gram/5.4 gram powd Take 5.4 g by mouth daily. W/ glass of water. Drink another glass of water after taking fiber.  tamsulosin (FLOMAX) 0.4 mg capsule Take 1 Cap by mouth nightly. For prostate/urine.  amLODIPine (NORVASC) 5 mg tablet Take 1 Tab by mouth daily. For blood pressure.  hydroCHLOROthiazide (MICROZIDE) 12.5 mg capsule Take 1 Cap by mouth daily.  triamcinolone acetonide (KENALOG) 0.1 % topical cream Apply to tops of feet only if itchy twice a day, use thin layer; always use Eucerin cream twice a day    aspirin 81 mg chewable tablet Take 100 mg by mouth daily. Pt brought in box of Aspirin that was 100 mg tablets.  OTHER Pt brought in a bag of little white pills that he takes for his mouth when it is swollen as needed. It was [place] from [place] and is unlabeled. He took it 3 times last week.  metFORMIN (GLUCOPHAGE) 1,000 mg tablet Take 1 Tab by mouth two (2) times daily (with meals).  glimepiride (AMARYL) 2 mg tablet Take 1 Tab by mouth every morning.  simvastatin (ZOCOR) 20 mg tablet Take 1 Tab by mouth nightly for 360 days.  For cholesterol     ______________________________________________________________________  EXPECTED LENGTH OF STAY: TBD  ACTUAL LENGTH OF STAY:          0                 Pito Magallanes MD

## 2018-01-21 NOTE — ED TRIAGE NOTES
Triage: Patient arrives escorted by family for c/o altered mental status beginning at 2000 1/20/18. Patients primary language not [place], family translating at this time. Patient uncooperative and difficult to obtain info from. POC glucose 389.

## 2018-01-21 NOTE — ED NOTES
Verbal report received from Lance Mena   [place]. SBAR, MAR, Recent Results reviewed. Opportunity for questions provided.

## 2018-01-21 NOTE — PROGRESS NOTES
Day #1 of Vancomycin (continued)  Indication:  Sepsis  Current regimen:  Vancomycin 750mg IV q24h  Abx regimen:  Vancomycin/Zosyn    ID Following ?: NO  Frequency of BMP:  daily  Recent Labs      01/21/18   0602  01/21/18   0017   WBC  7.6  8.3   CREA  1.30  1.49*   BUN  18  21*   Tmax 101    Cultures:   1/21 urine pending  1/21 paired blood pending  1/21 nasopharyngeal swab pending    Goal trough = 15 - 20 mcg/mL    Recent trough history (date/time/level/dose/action taken):  none    Plan: Change to Vancomycin 1gm IV q24h for predicted trough ~18mcg/ml.

## 2018-01-21 NOTE — PROGRESS NOTES
Admission Medication Reconciliation:    Comments/Recommendations:  -Medication history completed in the emergency department  -Unable to interview patient due to altered mental status and language barrier  -Of note, unable to confirm if patient is taking or is supposed to take glimepiride 2 mg daily. This medication bottle was not brought in to the hospital, and per [facility] DR RAMU GARCIA the patient last filled a 30 day supply on 12/11/2017. Unclear if current medication or medication noncompliance, but the patient's female family member in room states she does think he takes two medications for diabetes (metformin and one other tablet, presumably glimepiride). Information obtained from: Family member present in room, medication bottles, and [facility] DR RAMU GARCIA at [phone]    Significant PMH/Disease States:   Past Medical History:   Diagnosis Date    Controlled type 2 diabetes mellitus without complication, without long-term [facility] use [facility] 10/27/2017    Essential hypertension 10/27/2017    Mixed hyperlipidemia 10/27/2017       Chief Complaint for this Admission:    Chief Complaint   Patient presents with    Altered mental status         Allergies:  Ace inhibitors    Prior to Admission Medications:   Prior to Admission Medications   Prescriptions Last Dose Informant Patient Reported? Taking? amLODIPine (NORVASC) 5 mg tablet 1/14/2018 at Unknown time  No Yes   Sig: Take 1 Tab by mouth daily. For blood pressure. aspirin delayed-release 81 mg tablet 1/14/2018 at Unknown time  Yes Yes   Sig: Take 81 mg by mouth daily. glimepiride (AMARYL) 2 mg tablet 12/21/2017 at Unknown time  No Yes   Sig: Take 1 Tab by mouth every morning. hydroCHLOROthiazide (MICROZIDE) 12.5 mg capsule 1/14/2018 at Unknown time  No Yes   Sig: Take 1 Cap by mouth daily. metFORMIN (GLUCOPHAGE) 1,000 mg tablet 1/14/2018 at Unknown time  No Yes   Sig: Take 1 Tab by mouth two (2) times daily (with meals).    psyllium husk (METAMUCIL) 3.4 gram/5.4 gram powd 1/14/2018 at Unknown time  Yes Yes   Sig: Take 5.4 g by mouth daily. W/ glass of water. Drink another glass of water after taking fiber. simvastatin (ZOCOR) 20 mg tablet 1/14/2018 at Unknown time  No Yes   Sig: Take 1 Tab by mouth nightly for 360 days. For cholesterol   tamsulosin (FLOMAX) 0.4 mg capsule 1/14/2018 at Unknown time  No Yes   Sig: Take 1 Cap by mouth nightly. For prostate/urine.    triamcinolone acetonide (KENALOG) 0.1 % topical cream 1/14/2018 at Unknown time  No Yes   Sig: Apply to tops of feet only if itchy twice a day, use thin layer; always use Eucerin cream twice a day      Facility-Administered Medications: None       Thank you,  RUDY Gordon

## 2018-01-21 NOTE — PROGRESS NOTES
Pharmacist Note - Vancomycin Dosing    Consult provided for this 80 y.o. male for indication of sepsis. Antibiotic regimen(s): vancomycin and zosyn    Recent Labs      18   0017   WBC  8.3   CREA  1.49*   BUN  21*     Frequency of BMP: daily  Height: 152.4 cm  Weight: 49.4 kg  Est CrCl: ~26 ml/min  Temp (24hrs), Av °F (38.3 °C), Min:101 °F (38.3 °C), Max:101 °F (38.3 °C)    Cultures:   blood    Goal trough = 15 - 20 mcg/mL    Pt was ordered 2000 mg loading dose in ED, prior to pt being weighed. Pt received aprox 1000 mg of that dose prior to pharmacy calling to let them know dose needed to be changed( not marked given on MAR )  A maintenance dose of 750 mg IV every 24 hours was ordered . Pharmacy to follow patient daily and order levels / make dose adjustments as appropriate.

## 2018-01-21 NOTE — PROGRESS NOTES
0800 Bedside and Verbal shift change report given to [address] (oncoming nurse) by Urmila Tatum RN (offgoing nurse). Report included the following information SBAR, Kardex, ED Summary, Recent Results, Med Rec Status and Cardiac Rhythm NSR.

## 2018-01-21 NOTE — ED PROVIDER NOTES
Patient is a 80 y.o. male presenting with altered mental status. Altered mental status       80year old male with NIDDM, HTN, presents to ED with altered mental status since around 8pm. Family states he was fine all day, ate normally. No fever or cough. No n/v. Hadn't complained of anything today. No injury. Has never acted like this before. Normal memory issues for age per family. No psych history. Was seen recently by Leah Levy for constipation. Son states he had a bowel movement today and missed the commode- was not diarrhea. Urinated on the floor in triage. Past Medical History:   Diagnosis Date    Controlled type 2 diabetes mellitus without complication, without long-term current use of insulin ([facility] 10/27/2017    Essential hypertension 10/27/2017    Mixed hyperlipidemia 10/27/2017       History reviewed. No pertinent surgical history. History reviewed. No pertinent family history. Social History     Social History    Marital status:      Spouse name: N/A    Number of children: N/A    Years of education: N/A     Occupational History    Not on file. Social History Main Topics    Smoking status: Former Smoker    Smokeless tobacco: Never Used    Alcohol use No    Drug use: No    Sexual activity: Not on file     Other Topics Concern    Not on file     Social History Narrative         ALLERGIES: Ace inhibitors    Review of Systems   Unable to perform ROS: Mental status change       Vitals:    01/21/18 0014   BP: 144/90   Pulse: (!) 107   Resp: 28   SpO2: 99%            Physical Exam   Constitutional: He appears well-developed and well-nourished. No distress. Knees up at chest, hands clenched   HENT:   Head: Normocephalic and atraumatic. Mouth/Throat: Oropharynx is clear and moist. No oropharyngeal exudate. Eyes: Conjunctivae and EOM are normal. Pupils are equal, round, and reactive to light. Right eye exhibits no discharge. Left eye exhibits no discharge.  No scleral icterus. Neck: Normal range of motion. Neck supple. No JVD present. Cardiovascular: Regular rhythm, normal heart sounds and intact distal pulses. Tachycardia present. Exam reveals no gallop and no friction rub. No murmur heard. Pulmonary/Chest: Effort normal and breath sounds normal. No stridor. No respiratory distress. He has no wheezes. He has no rales. He exhibits no tenderness. Abdominal: Soft. Bowel sounds are normal. He exhibits no distension and no mass. There is no tenderness. There is no rebound and no guarding. Musculoskeletal: Normal range of motion. He exhibits no edema or tenderness. Neurological: He is alert. He has normal reflexes. No cranial nerve deficit. He exhibits normal muscle tone. Coordination normal.   Will only say in  dialect \"leave me alone\"   Skin: Skin is warm and dry. No rash noted. No erythema. Psychiatric: He has a normal mood and affect. His behavior is normal. Judgment and thought content normal.        MDM  ED Course       Procedures      Febrile to 101- WBC normal, chem ok- mild bump bun/cr. Lactate 5. Fluids ordered. Head/abdomen CT non acute, CXR clear. Urine clear. Flu negative. GIven lactate, triple antibiotics started with cultures pending. bp ok. Will admit.

## 2018-01-21 NOTE — ED NOTES
Patient continues to be restless/agitated. Unable to perform ordered EKG. Dr. Deonte Ocampo is aware.

## 2018-01-21 NOTE — PROGRESS NOTES
Notifed by ED PCT of pt's blood sugar of 65, and 54 on recheck. 25g D50 administered as per orders, see MAR. Pt resting at this time.

## 2018-01-21 NOTE — H&P
[facility]  ACUTE CARE HISTORY AND PHYSICAL    Luci Khan  MR#: [id number]  : 1934  ACCOUNT #: [de-identified]   DATE OF SERVICE: 2018    PRIMARY CARE PHYSICIAN:  Dr. Kassie Bardales. SOURCE OF INFORMATION:  The patient's son who was present at the bedside. CHIEF COMPLAINT:  Change in mental status. HISTORY OF PRESENT ILLNESS:  This is an 80-year-old man with a past medical history significant for hypertension, type 2 diabetes, benign prostatic hyperplasia, who was in his usual state of health until the day of presentation at the emergency room when it was reported that the patient developed a change in mental status. According to the patient's son, who was present at the bedside and the history was obtained from the son because the patient is not English-speaking, the son reported that the patient has no memory problem normally, ambulates with a cane, came to [facility] from [facility] in 2017. Patient has been relatively stable until about a week ago when the patient was taken to the [address] [place] [address] clinic because of constipation. Since after the evaluation at the [address] [place] [address], the patient was placed on laxatives, the constipation was no longer an issue. When the patient arrived at the emergency room, he was having a lot of rigors. He was in fetal position with a rectal temperature of 101. His lactic acid level was also elevated. Patient was started on Levaquin and Zosyn and he was referred to the hospitalist service for evaluation for admission. PAST MEDICAL HISTORY:  Hypertension, benign prostatic hyperplasia, diabetes. ALLERGIES:  PATIENT IS ALLERGIC TO ACE INHIBITOR. MEDICATIONS:  Norvasc 5 mg daily, aspirin 81 mg daily, Amaryl 2 mg daily in the morning, hydrochlorothiazide 12.5 mg daily, Glucophage 1000 mg twice daily, Zocor 20 mg daily, Metamucil 1 pack daily, Flomax 0.4 mg daily.     FAMILY HISTORY:  This was reviewed, not pertinent to present illness. No family history of dementia or psychiatric disorder. PAST SURGICAL HISTORY:  Not significant. SOCIAL HISTORY:  Patient is a former smoker. No history of alcohol abuse. REVIEW OF SYSTEMS:  Unable to obtain because of the patient's mental status. PHYSICAL EXAMINATION:  GENERAL APPEARANCE:  The patient appeared ill, in moderate distress. VITAL SIGNS:  On arrival in the emergency room, temperature 101, pulse 107, respiratory rate 28, blood pressure 144/90, oxygen saturation 99% on room air. HEENT:  Head:  Normocephalic, atraumatic. Eyes:  Unable to assess eye movement. No redness, no drainage, no discharge. Ears:  Normal external ears with no obvious drainage. Nose:  No deformity, no drainage. Mouth and throat:  No visible oral lesions. Dry oral mucosa. NECK:  Supple, no JVD, no thyromegaly. CHEST:  Clear breath sounds. No wheezing, no crackles. HEART:  Normal S1 and S2, regular. No clinically appreciable murmur. ABDOMEN:  Soft, nontender, normal bowel sounds. CENTRAL NERVOUS SYSTEM:  Alert, not oriented. The patient in fetal position. EXTREMITIES:  No edema. Pulses 2+ bilaterally. MUSCULOSKELETAL:  No obvious joint deformity, hand swelling. SKIN:  No active skin lesion seen on the exposed part of the body. PSYCHIATRIC:  Unable to assess mood and affect. LYMPHATIC:  No cervical lymphadenopathy. DIAGNOSTIC DATA:  Chest x-ray:  No acute pathology. CT scan of the abdomen with contrast is pending. CT scan of the head without contrast is pending. LABORATORY DATA:  Influenza A antigen negative. Influenza B antigen negative. Urinalysis: This is significant for moderate blood, negative nitrites, negative leukocyte esterase, negative bacteria. Lactic acid level 5.1. Chemistry:  Sodium 133, potassium 4.0, chloride 93, CO2 of 27, glucose 335, BUN 21, creatinine 1.49.   Calcium 9.3, bilirubin total 0.4, ALT 12, AST 15, alkaline phosphatase 53, total protein 8.5, albumin level 3.6, globulin at 4.9. Hematology:  WBC 8.3, hemoglobin 9.6, hematocrit 30.4, platelets 687. ASSESSMENT:  1. Acute delirium. 2.  Sepsis. 3.  Acute renal failure. 4.  Type 2 diabetes with hyperglycemia. 5.  Hyponatremia. 6.  Anemia, secondary to chronic disease. 7.  Benign prostatic hyperplasia without urinary obstruction. 8.  Hypertension. 9.  Dyslipidemia. PLAN:  1. Acute delirium. Will admit the patient for further evaluation and treatment. This is most likely due to metabolic event such as sepsis. This will be discussed below. The cause of it due to the acute renal failure this was already discussed below. Will identify and treat underlying etiological factors. Will await results of the CT scan of the head. Will check a TSH level. The acute delirium could also be due to dementia, not yet diagnosed. Will check O66 and folic acid level. 2.  Sepsis. Will start the patient on vancomycin and Zosyn for suspected sepsis. Will await results of the CT scan of the abdomen. Will also obtain a CT scan of the chest to evaluate the patient for possible sources of sepsis. Will check amylase and lipase level. Will await blood culture. The diagnosis of sepsis is supported by fever, tachycardia, elevated lactic acid level. 3.  Acute renal failure. This is most likely due to volume depletion. Will carry out hydration with normal saline and monitor the patient's renal function. 4.  Type 2 diabetes with hyperglycemia. We started the patient on sliding scale with insulin coverage. Will check hemoglobin A1c level. Will hold oral agent until the time of discharge from the hospital.  5.  Hyponatremia. Will carry out hydration with normal saline. Will await results of the CT scan of the chest to evaluate the patient for mass lesion. The hyponatremia is most likely due to volume depletion versus anemia. This is most likely secondary to chronic disease.   Patient's hemoglobin was 11.5 about a week ago. Today, it is 9.6. Will carry out anemia workup, this including checking stool guaiac to rule out occult GI bleed. 6.  Benign prostatic hyperplasia. Will resume home medication. 7.  Hypertension. Will continue with preadmission medication. Monitor the patient's blood pressure closely. 8.  Dyslipidemia. Will resume home medication. 9.  Other issues:  CODE STATUS:  THE PATIENT IS A FULL CODE. Will place the patient on heparin for DVT prophylaxis.       Yamilet Ospina MD       RE / BAILEY  D: 01/21/2018 03:29     T: 01/21/2018 08:30  JOB #: [id number]  CC: Aspen Cruz MD

## 2018-01-21 NOTE — ED NOTES
Hospitalist Team 5 notified patient remains febrile, combative, and EKG has still not been performed due to patient combativeness. Rectal Tylenol ordered for patient.

## 2018-01-22 ENCOUNTER — APPOINTMENT (OUTPATIENT)
Dept: CT IMAGING | Age: 83
DRG: 305 | End: 2018-01-22
Attending: INTERNAL MEDICINE
Payer: SUBSIDIZED

## 2018-01-22 ENCOUNTER — TELEPHONE (OUTPATIENT)
Dept: FAMILY MEDICINE CLINIC | Age: 83
End: 2018-01-22

## 2018-01-22 LAB
ANION GAP SERPL CALC-SCNC: 6 MMOL/L (ref 5–15)
ATRIAL RATE: 68 BPM
BACTERIA SPEC CULT: NORMAL
BUN SERPL-MCNC: 16 MG/DL (ref 6–20)
BUN/CREAT SERPL: 12 (ref 12–20)
CALCIUM SERPL-MCNC: 7.6 MG/DL (ref 8.5–10.1)
CALCULATED P AXIS, ECG09: 45 DEGREES
CALCULATED R AXIS, ECG10: 41 DEGREES
CALCULATED T AXIS, ECG11: 95 DEGREES
CC UR VC: NORMAL
CHLORIDE SERPL-SCNC: 106 MMOL/L (ref 97–108)
CO2 SERPL-SCNC: 26 MMOL/L (ref 21–32)
CREAT SERPL-MCNC: 1.33 MG/DL (ref 0.7–1.3)
DIAGNOSIS, 93000: NORMAL
ERYTHROCYTE [DISTWIDTH] IN BLOOD BY AUTOMATED COUNT: 14.5 % (ref 11.5–14.5)
GLUCOSE BLD STRIP.AUTO-MCNC: 143 MG/DL (ref 65–100)
GLUCOSE BLD STRIP.AUTO-MCNC: 148 MG/DL (ref 65–100)
GLUCOSE BLD STRIP.AUTO-MCNC: 259 MG/DL (ref 65–100)
GLUCOSE BLD STRIP.AUTO-MCNC: 62 MG/DL (ref 65–100)
GLUCOSE BLD STRIP.AUTO-MCNC: 64 MG/DL (ref 65–100)
GLUCOSE SERPL-MCNC: 239 MG/DL (ref 65–100)
HCT VFR BLD AUTO: 23.7 % (ref 36.6–50.3)
HGB BLD-MCNC: 7.4 G/DL (ref 12.1–17)
MCH RBC QN AUTO: 24.9 PG (ref 26–34)
MCHC RBC AUTO-ENTMCNC: 31.2 G/DL (ref 30–36.5)
MCV RBC AUTO: 79.8 FL (ref 80–99)
P-R INTERVAL, ECG05: 142 MS
PLATELET # BLD AUTO: 227 K/UL (ref 150–400)
POTASSIUM SERPL-SCNC: 3.8 MMOL/L (ref 3.5–5.1)
Q-T INTERVAL, ECG07: 402 MS
QRS DURATION, ECG06: 86 MS
QTC CALCULATION (BEZET), ECG08: 427 MS
RBC # BLD AUTO: 2.97 M/UL (ref 4.1–5.7)
SERVICE CMNT-IMP: ABNORMAL
SERVICE CMNT-IMP: NORMAL
SODIUM SERPL-SCNC: 138 MMOL/L (ref 136–145)
VENTRICULAR RATE, ECG03: 68 BPM
WBC # BLD AUTO: 5.8 K/UL (ref 4.1–11.1)

## 2018-01-22 PROCEDURE — 74011250636 HC RX REV CODE- 250/636: Performed by: INTERNAL MEDICINE

## 2018-01-22 PROCEDURE — 74011636637 HC RX REV CODE- 636/637: Performed by: INTERNAL MEDICINE

## 2018-01-22 PROCEDURE — 74011250637 HC RX REV CODE- 250/637: Performed by: INTERNAL MEDICINE

## 2018-01-22 PROCEDURE — 85027 COMPLETE CBC AUTOMATED: CPT | Performed by: INTERNAL MEDICINE

## 2018-01-22 PROCEDURE — 80048 BASIC METABOLIC PNL TOTAL CA: CPT | Performed by: INTERNAL MEDICINE

## 2018-01-22 PROCEDURE — 82962 GLUCOSE BLOOD TEST: CPT

## 2018-01-22 PROCEDURE — 74011000250 HC RX REV CODE- 250: Performed by: INTERNAL MEDICINE

## 2018-01-22 PROCEDURE — 74011250636 HC RX REV CODE- 250/636: Performed by: EMERGENCY MEDICINE

## 2018-01-22 PROCEDURE — 65660000000 HC RM CCU STEPDOWN

## 2018-01-22 PROCEDURE — 36415 COLL VENOUS BLD VENIPUNCTURE: CPT | Performed by: INTERNAL MEDICINE

## 2018-01-22 RX ORDER — HYDROCHLOROTHIAZIDE 25 MG/1
12.5 TABLET ORAL DAILY
Status: DISCONTINUED | OUTPATIENT
Start: 2018-01-23 | End: 2018-01-25 | Stop reason: HOSPADM

## 2018-01-22 RX ORDER — SODIUM CHLORIDE 0.9 % (FLUSH) 0.9 %
10 SYRINGE (ML) INJECTION
Status: ACTIVE | OUTPATIENT
Start: 2018-01-22 | End: 2018-01-23

## 2018-01-22 RX ADMIN — Medication 10 ML: at 14:00

## 2018-01-22 RX ADMIN — FERROUS SULFATE TAB 325 MG (65 MG ELEMENTAL FE) 325 MG: 325 (65 FE) TAB at 08:50

## 2018-01-22 RX ADMIN — VANCOMYCIN HYDROCHLORIDE 1000 MG: 10 INJECTION, POWDER, LYOPHILIZED, FOR SOLUTION INTRAVENOUS at 03:45

## 2018-01-22 RX ADMIN — Medication 10 ML: at 05:45

## 2018-01-22 RX ADMIN — PSYLLIUM HUSK 1 PACKET: 3.4 POWDER ORAL at 08:59

## 2018-01-22 RX ADMIN — ASPIRIN 81 MG: 81 TABLET, COATED ORAL at 08:50

## 2018-01-22 RX ADMIN — DEXTROSE MONOHYDRATE 12.5 G: 500 INJECTION PARENTERAL at 17:29

## 2018-01-22 RX ADMIN — SODIUM CHLORIDE 125 ML/HR: 900 INJECTION, SOLUTION INTRAVENOUS at 01:53

## 2018-01-22 RX ADMIN — DOCUSATE SODIUM 100 MG: 100 CAPSULE, LIQUID FILLED ORAL at 08:50

## 2018-01-22 RX ADMIN — SIMVASTATIN 20 MG: 20 TABLET, FILM COATED ORAL at 22:52

## 2018-01-22 RX ADMIN — AMLODIPINE BESYLATE 5 MG: 5 TABLET ORAL at 08:50

## 2018-01-22 RX ADMIN — Medication 10 ML: at 22:52

## 2018-01-22 RX ADMIN — PIPERACILLIN AND TAZOBACTAM 10.12 G: 3; .375 INJECTION, POWDER, FOR SOLUTION INTRAVENOUS at 12:00

## 2018-01-22 RX ADMIN — HEPARIN SODIUM 5000 UNITS: 5000 INJECTION, SOLUTION INTRAVENOUS; SUBCUTANEOUS at 13:52

## 2018-01-22 RX ADMIN — HEPARIN SODIUM 5000 UNITS: 5000 INJECTION, SOLUTION INTRAVENOUS; SUBCUTANEOUS at 04:49

## 2018-01-22 RX ADMIN — HEPARIN SODIUM 5000 UNITS: 5000 INJECTION, SOLUTION INTRAVENOUS; SUBCUTANEOUS at 22:52

## 2018-01-22 RX ADMIN — INSULIN LISPRO 5 UNITS: 100 INJECTION, SOLUTION INTRAVENOUS; SUBCUTANEOUS at 11:47

## 2018-01-22 RX ADMIN — TAMSULOSIN HYDROCHLORIDE 0.4 MG: 0.4 CAPSULE ORAL at 22:52

## 2018-01-22 RX ADMIN — Medication 1 CAPSULE: at 08:49

## 2018-01-22 RX ADMIN — SODIUM CHLORIDE 125 ML/HR: 900 INJECTION, SOLUTION INTRAVENOUS at 12:00

## 2018-01-22 NOTE — TELEPHONE ENCOUNTER
Case Management Worker, Geovany Coats, at [facility], [phone], called to reschedule this patient's appointment, which was for 1/24 because the patient will still be in the hospital.  She really wanted us to see this patient for a fu early next week, but I couldn't find an open appointment until 2/7. If you feel the patient should be seen by overbooking a slot, please let Front Office know so they can reschedule the 2/7 appointment. Thanks.     Vanesa Minor

## 2018-01-22 NOTE — PROGRESS NOTES
Problem: General Medical Care Plan  Goal: *Vital signs within specified parameters  Outcome: Progressing Towards Goal  Orders to transfer to telemetry

## 2018-01-22 NOTE — PROGRESS NOTES
Hospital follow-up PCP transitional care appointment has been scheduled with Dr. Keiry Beckman at [facility] located at [facility] for Wednesday, 2/7/18 at 1:15 p.m. Pending patient discharge.   Ervin Loomis, Care Management Specialist.

## 2018-01-22 NOTE — PROGRESS NOTES
HYPOGLYCEMIC EPISODE DOCUMENTATION    Patient with hypoglycemic episode(s) at 1710 on 1/22/18. BG value(s) pre-treatment 58    Was patient symptomatic? [] yes, [x] no  Patient was treated with the following rescue medications/treatments: [] D50                [] Glucose tablets                [] Glucagon                [x] 6oz juice                [] 6oz reg soda                [] 8oz low fat milk  BG value post-treatment: 64; 1/2 amp D50 given, 143 on recheck. Once BG treated and value greater than 80mg/dl, pt was provided with the following:  [] snack  [] meal  Name of MD notified:Tigertext sent to Dr. Corinne Median.   The following orders were received: none received yet

## 2018-01-22 NOTE — PROGRESS NOTES
1930-bedside shift report received from [facility] using sbar format  1954-received pt into ICU 10 on stretcher/cardiac monitor-son accompanies pt to translate as pt does not speak english (pt speaks french/pigeon) -pt eating PB and saltines and drinking diet ginger ale/pt calm/cooperative& pleasant oriented to self though knows he is in a hospital-fine tremors noted in BUE at rest and becoming coarse with min movement-per son fine tremors in BUE is not new the severity has increased since  pt has been sick-pt noted to have difficulty holding soda can/fork/feed self  2115-chicken pot pie given to pt and son/ son to stay with pt/ recliner provided  2130-pt asleep ate very little chicken pot pie per son  2200-awake and eating what appears to be a salad/tuna fish brought in by family-feeding self with some difficulty d/t tremors son assisting as much as pt will allow  [id number] asleep unless disturbed          0730-bedside shift report given to  [facility] using sbar format

## 2018-01-22 NOTE — PROGRESS NOTES
Patient has no ICU needs and is being transferred     If you have any pulmonary questions-please consult pulmonary    Thank you for allowing [facility] to participate in Levon care. We will see again as needed.

## 2018-01-22 NOTE — PROGRESS NOTES
Patient is an 79 y/o  male, admitted to [facility] 1/21/2018 for AMS likely 2/2 sepsis. Patient non-English speaking, from [facility]. CM s/w oksanalucy's son Nas Ramirez at patient bedside. Jenn Spivey confirmed primary contact is Marvin's son (pt grandson) Rasta Gallo [phone]. Patient has been in the [place] a visitor visa since Nov 2017, plan to return to [facility] March 2018. Patient recently seen and has upcoming appt at [facility]. Patient was evaluated for outpatient care at [address] [place] [address] however was denied 2/2 patient is only visiting the [place] Patient currently staying with [facility], independent with ADL/IADL's, ambulates with cane, fills prescriptions at [facility]. Jenn Spivey reports that patient's mental status has improved significantly since yesterday. Hope that as mental status improves and patient able to ambulate he will be stable to return home with family. Note that patient has follow-up with Olga Matt on [facility] 1/24. CM sent referral to Sia requesting that appointment be changed to either later this week or early next week. Discharge home with family when medically clear. CM available for consult should new needs arise. JESSICA Orellana/REYNALDO    Care Management Interventions  PCP Verified by CM:  Yes  Last Visit to PCP: 01/11/18  Nazario Signup: No  Discharge Durable Medical Equipment: No  Physical Therapy Consult: No  Occupational Therapy Consult: No  Speech Therapy Consult: No  Current Support Network: Relative's Home  Confirm Follow Up Transport: Family  Plan discussed with Pt/Family/Caregiver: Yes  Discharge Location  Discharge Placement: Home

## 2018-01-22 NOTE — DIABETES MGMT
DTC Progress Note    Recommendations/ Comments: Chart reviewed for fluctuating blood sugars. Patient hypoglycemic at lunch time yesterday (54 mg/dL @ 1117) most likely due to 5 units of correction given that morning. Blood sugars now > 200 mg/dL. If appropriate, please consider:  1. Changing correction scale to high sensitivity  2. Adding Glimepiride 1 mg (patient takes 2 mg at home)    Current hospital DM medication: Lispro for correction, normal sensitivity    Patient is a 80 y.o. male with a history of Type 2 Diabetes on oral agents (dual therapy): glimepiride (Amaryl), metformin (generic) at home. A1c:   Lab Results   Component Value Date/Time    Hemoglobin A1c 8.5 01/15/2018 09:41 AM    Hemoglobin A1c 7.7 11/08/2017 11:56 AM       Recent Glucose Results: Lab Results   Component Value Date/Time     (H) 01/22/2018 02:30 AM    GLUCPOC 244 (H) 01/21/2018 08:19 PM    GLUCPOC 207 (H) 01/21/2018 07:22 PM    GLUCPOC 200 (H) 01/21/2018 11:44 AM        Lab Results   Component Value Date/Time    Creatinine 1.33 01/22/2018 02:30 AM     Estimated Creatinine Clearance: 31.8 mL/min (based on Cr of 1.33). Active Orders   Diet    DIET DIABETIC CONSISTENT CARB Regular; 2 GM NA (House Low NA)        PO intake: Patient Vitals for the past 72 hrs:   % Diet Eaten   01/21/18 2200 100 %       Will continue to follow as needed.     Thank you  Tahmina Dangelo, MS, RN, CDE

## 2018-01-22 NOTE — PROGRESS NOTES
Hospitalist Progress Note  Collette Tian MD  Office: [phone]      Date of Service:  2018  NAME:  Rosa Avilez  :  1934  MRN:  [id number]      Admission Summary:   History taken from his son at the bed side, Patient is not communicative. This is 80 y.o female with medical history of type II DM, BPH, hypertension, and Hyperlipidemia presented to the hospital with a chief complaint of AMS and fever of one day duration. Denied of having cough chills and rigor prior to this incident. Labs reviewed with significantly elevated lactate which trended down. Interval history / Subjective:   Alert and oriented, not in respiratory distress. Explained the current plan of care to the son at the bed side. Patient able to eat without any difficulty      Assessment & Plan:       AMS secondary to Sepsis syndrome of ?  Viral in origin (URI) VS Urinary tract in origin    AMS resolved    Transfer out of Medical ICU   Tachycardic, febrile, and AMS at presentation      CT head and abdomen: no acute finding   Influenza antigen negative   Viral PCR negative   Continue IV fluids   Continue Levaquin, Zosyn and Vancomycin   Blood and urine culture: NGTD   Tylenol on PRN bases   Trend fever and WBC count     Microcytic anemia   Iron studies more looks consistent with iron deficiency anemia   Continue with iron sulfate    Follow H and H every 12 hours   Transfuse if hgb is < 7 gm     History of BPH   Continue Tamsulosin     Hypertension   Start on HCTZ 12.5 mg oral per day     Type II DM   AC HS coverage   Continue with SSI   Hold oral hypoglycemic for now     Code status: Full Code   DVT prophylaxis: on Heparin (watch for significant drop     Care Plan discussed with: Patient/Family and Nurse  Disposition: TBD     Hospital Problems  Date Reviewed: 2018          Codes Class Noted POA    * (Principal)Acute delirium ICD-10-CM: R41.0  ICD-9-CM: 780.09 1/21/2018 Yes                Review of Systems:   A comprehensive review of systems was negative except for that written in the HPI. Vital Signs:    Last 24hrs VS reviewed since prior progress note. Most recent are:  Visit Vitals    /67    Pulse (!) 57    Temp 98.6 °F (37 °C)    Resp 22    Ht 5' (1.524 m)    Wt 55.4 kg (122 lb 2.2 oz)    SpO2 93%    BMI 23.85 kg/m2         Intake/Output Summary (Last 24 hours) at 01/22/18 4962  Last data filed at 01/22/18 0700   Gross per 24 hour   Intake          5311.66 ml   Output              300 ml   Net          5011.66 ml        Physical Examination:             Constitutional:  alert and look younger than stated age    ENT:  Oral mucous moist, oropharynx benign. Neck supple,    Resp: Clear and resonant chest anteriorly    CV:  rate in the 90's     GI:  Soft, non distended, non tender. normoactive bowel sounds, no hepatosplenomegaly     Musculoskeletal:  No edema, warm, 2+ pulses throughout    Neurologic:  Moves all extremities. Alert but confused      Psych:  confused and not oriented to place and person        Data Review:    Review and/or order of clinical lab test  Review and/or order of tests in the medicine section of [facility]      Labs:     Recent Labs      01/22/18   0230  01/21/18   1353  01/21/18   0602   WBC  5.8   --   7.6   HGB  7.4*  7.8*  8.2*   HCT  23.7*  24.3*  25.9*   PLT  227   --   253     Recent Labs      01/22/18   0230  01/21/18   0602  01/21/18   0017   NA  138  133*  133*   K  3.8  5.3*  4.0   CL  106  100  93*   CO2  26  27  27   BUN  16  18  21*   CREA  1.33*  1.30  1.49*   GLU  239*  259*  335*   CA  7.6*  8.3*  9.3   MG   --   1.7   --    PHOS   --   3.1   --      Recent Labs      01/21/18   0602  01/21/18   0017   SGOT  34  16   ALT  13  12   AP  41*  53   TBILI  0.4  0.4   TP  7.0  8.5*   ALB  2.9*  3.6   GLOB  4.1*  4.9*   AML  38   --    LPSE  161   --      No results for input(s): INR, PTP, APTT in the last 72 hours.     No lab exists for component: INREXT, INREXT   Recent Labs      01/21/18   0530   TIBC  397   PSAT  8*   FERR  9*      Lab Results   Component Value Date/Time    Folate 26.5 01/21/2018 05:30 AM      No results for input(s): PH, PCO2, PO2 in the last 72 hours.   Recent Labs      01/21/18   1354  01/21/18   0602  01/21/18   0530   CPK  232  287   --    CKNDX  0.4  0.4   --    TROIQ  0.04  <0.04  <0.04     Lab Results   Component Value Date/Time    Cholesterol, total 129 01/21/2018 06:02 AM    HDL Cholesterol 70 01/21/2018 06:02 AM    LDL, calculated 50.2 01/21/2018 06:02 AM    Triglyceride 44 01/21/2018 06:02 AM    CHOL/HDL Ratio 1.8 01/21/2018 06:02 AM     Lab Results   Component Value Date/Time    Glucose (POC) 244 01/21/2018 08:19 PM    Glucose (POC) 207 01/21/2018 07:22 PM    Glucose (POC) 200 01/21/2018 11:44 AM    Glucose (POC) 54 01/21/2018 11:17 AM    Glucose (POC) 65 01/21/2018 11:14 AM     Lab Results   Component Value Date/Time    Color YELLOW/STRAW 01/21/2018 12:37 AM    Appearance CLEAR 01/21/2018 12:37 AM    Specific gravity 1.016 01/21/2018 12:37 AM    pH (UA) 7.5 01/21/2018 12:37 AM    Protein 100 01/21/2018 12:37 AM    Glucose >1000 01/21/2018 12:37 AM    Ketone NEGATIVE  01/21/2018 12:37 AM    Bilirubin NEGATIVE  01/21/2018 12:37 AM    Urobilinogen 0.2 01/21/2018 12:37 AM    Nitrites NEGATIVE  01/21/2018 12:37 AM    Leukocyte Esterase NEGATIVE  01/21/2018 12:37 AM    Epithelial cells FEW 01/21/2018 12:37 AM    Bacteria NEGATIVE  01/21/2018 12:37 AM    WBC 0-4 01/21/2018 12:37 AM    RBC 0-5 01/21/2018 12:37 AM         Medications Reviewed:     Current Facility-Administered Medications   Medication Dose Route Frequency    0.9% sodium chloride infusion  125 mL/hr IntraVENous CONTINUOUS    amLODIPine (NORVASC) tablet 5 mg  5 mg Oral DAILY    simvastatin (ZOCOR) tablet 20 mg  20 mg Oral QHS    psyllium husk-aspartame (METAMUCIL FIBER) packet 1 Packet  1 Packet Oral DAILY    tamsulosin (FLOMAX) capsule 0.4 mg  0.4 mg Oral QHS    triamcinolone acetonide (KENALOG) 0.1 % cream   Topical BID    sodium chloride (NS) flush 5-10 mL  5-10 mL IntraVENous Q8H    sodium chloride (NS) flush 5-10 mL  5-10 mL IntraVENous PRN    ondansetron (ZOFRAN) injection 4 mg  4 mg IntraVENous Q4H PRN    docusate sodium (COLACE) capsule 100 mg  100 mg Oral BID    heparin (porcine) injection 5,000 Units  5,000 Units SubCUTAneous Q8H    lactobac ac& pc-s.therm-b.anim (ROSARIO Q/RISAQUAD)  1 Cap Oral DAILY    insulin lispro (HUMALOG) injection   SubCUTAneous AC&HS    glucose chewable tablet 16 g  4 Tab Oral PRN    dextrose (D50W) injection syrg 12.5-25 g  12.5-25 g IntraVENous PRN    glucagon (GLUCAGEN) injection 1 mg  1 mg IntraMUSCular PRN    Vancomycin Pharmacy to Dose   Other Rx Dosing/Monitoring    piperacillin-tazobactam (ZOSYN) 10.125 g in  mL continuous 24 hr infusion  10.125 g IntraVENous Q24H    acetaminophen (TYLENOL) suppository 650 mg  650 mg Rectal Q4H PRN    ferrous sulfate tablet 325 mg  1 Tab Oral DAILY WITH BREAKFAST    vancomycin (VANCOCIN) 1000 mg in  ml infusion  1,000 mg IntraVENous Q24H    aspirin delayed-release tablet 81 mg  81 mg Oral DAILY    acetaminophen (TYLENOL) tablet 650 mg  650 mg Oral Q4H PRN     ______________________________________________________________________  EXPECTED LENGTH OF STAY: TBD  ACTUAL LENGTH OF STAY:          1                 Susan Ashraf MD

## 2018-01-23 ENCOUNTER — APPOINTMENT (OUTPATIENT)
Dept: CT IMAGING | Age: 83
DRG: 305 | End: 2018-01-23
Attending: INTERNAL MEDICINE
Payer: SUBSIDIZED

## 2018-01-23 LAB
ANION GAP SERPL CALC-SCNC: 9 MMOL/L (ref 5–15)
BUN SERPL-MCNC: 9 MG/DL (ref 6–20)
BUN/CREAT SERPL: 7 (ref 12–20)
CALCIUM SERPL-MCNC: 8.7 MG/DL (ref 8.5–10.1)
CHLORIDE SERPL-SCNC: 106 MMOL/L (ref 97–108)
CO2 SERPL-SCNC: 25 MMOL/L (ref 21–32)
CREAT SERPL-MCNC: 1.26 MG/DL (ref 0.7–1.3)
GLUCOSE BLD STRIP.AUTO-MCNC: 144 MG/DL (ref 65–100)
GLUCOSE BLD STRIP.AUTO-MCNC: 150 MG/DL (ref 65–100)
GLUCOSE BLD STRIP.AUTO-MCNC: 180 MG/DL (ref 65–100)
GLUCOSE BLD STRIP.AUTO-MCNC: 221 MG/DL (ref 65–100)
GLUCOSE BLD STRIP.AUTO-MCNC: 230 MG/DL (ref 65–100)
GLUCOSE SERPL-MCNC: 143 MG/DL (ref 65–100)
HCT VFR BLD AUTO: 28.5 % (ref 36.6–50.3)
HGB BLD-MCNC: 8.9 G/DL (ref 12.1–17)
POTASSIUM SERPL-SCNC: 3.3 MMOL/L (ref 3.5–5.1)
SERVICE CMNT-IMP: ABNORMAL
SODIUM SERPL-SCNC: 140 MMOL/L (ref 136–145)

## 2018-01-23 PROCEDURE — 36415 COLL VENOUS BLD VENIPUNCTURE: CPT | Performed by: INTERNAL MEDICINE

## 2018-01-23 PROCEDURE — 85014 HEMATOCRIT: CPT | Performed by: INTERNAL MEDICINE

## 2018-01-23 PROCEDURE — 74011250636 HC RX REV CODE- 250/636: Performed by: INTERNAL MEDICINE

## 2018-01-23 PROCEDURE — 65660000000 HC RM CCU STEPDOWN

## 2018-01-23 PROCEDURE — 71275 CT ANGIOGRAPHY CHEST: CPT

## 2018-01-23 PROCEDURE — 74011636320 HC RX REV CODE- 636/320: Performed by: INTERNAL MEDICINE

## 2018-01-23 PROCEDURE — 74011636637 HC RX REV CODE- 636/637: Performed by: INTERNAL MEDICINE

## 2018-01-23 PROCEDURE — 80048 BASIC METABOLIC PNL TOTAL CA: CPT | Performed by: INTERNAL MEDICINE

## 2018-01-23 PROCEDURE — 74011250637 HC RX REV CODE- 250/637: Performed by: INTERNAL MEDICINE

## 2018-01-23 PROCEDURE — 74011000258 HC RX REV CODE- 258: Performed by: INTERNAL MEDICINE

## 2018-01-23 PROCEDURE — 82962 GLUCOSE BLOOD TEST: CPT

## 2018-01-23 RX ORDER — AMLODIPINE BESYLATE 5 MG/1
10 TABLET ORAL DAILY
Status: DISCONTINUED | OUTPATIENT
Start: 2018-01-24 | End: 2018-01-25 | Stop reason: HOSPADM

## 2018-01-23 RX ORDER — HYDRALAZINE HYDROCHLORIDE 20 MG/ML
10 INJECTION INTRAMUSCULAR; INTRAVENOUS
Status: DISCONTINUED | OUTPATIENT
Start: 2018-01-23 | End: 2018-01-25 | Stop reason: HOSPADM

## 2018-01-23 RX ORDER — SODIUM CHLORIDE 0.9 % (FLUSH) 0.9 %
10 SYRINGE (ML) INJECTION
Status: COMPLETED | OUTPATIENT
Start: 2018-01-23 | End: 2018-01-23

## 2018-01-23 RX ADMIN — PSYLLIUM HUSK 1 PACKET: 3.4 POWDER ORAL at 08:30

## 2018-01-23 RX ADMIN — HEPARIN SODIUM 5000 UNITS: 5000 INJECTION, SOLUTION INTRAVENOUS; SUBCUTANEOUS at 06:47

## 2018-01-23 RX ADMIN — Medication 1 CAPSULE: at 08:30

## 2018-01-23 RX ADMIN — INSULIN LISPRO 3 UNITS: 100 INJECTION, SOLUTION INTRAVENOUS; SUBCUTANEOUS at 17:18

## 2018-01-23 RX ADMIN — DOCUSATE SODIUM 100 MG: 100 CAPSULE, LIQUID FILLED ORAL at 08:29

## 2018-01-23 RX ADMIN — HEPARIN SODIUM 5000 UNITS: 5000 INJECTION, SOLUTION INTRAVENOUS; SUBCUTANEOUS at 12:58

## 2018-01-23 RX ADMIN — FERROUS SULFATE TAB 325 MG (65 MG ELEMENTAL FE) 325 MG: 325 (65 FE) TAB at 08:30

## 2018-01-23 RX ADMIN — HYDROCHLOROTHIAZIDE 12.5 MG: 25 TABLET ORAL at 08:30

## 2018-01-23 RX ADMIN — AMLODIPINE BESYLATE 5 MG: 5 TABLET ORAL at 08:29

## 2018-01-23 RX ADMIN — Medication 10 ML: at 11:33

## 2018-01-23 RX ADMIN — Medication 10 ML: at 06:47

## 2018-01-23 RX ADMIN — Medication 10 ML: at 21:39

## 2018-01-23 RX ADMIN — TAMSULOSIN HYDROCHLORIDE 0.4 MG: 0.4 CAPSULE ORAL at 22:07

## 2018-01-23 RX ADMIN — HEPARIN SODIUM 5000 UNITS: 5000 INJECTION, SOLUTION INTRAVENOUS; SUBCUTANEOUS at 21:16

## 2018-01-23 RX ADMIN — Medication 10 ML: at 22:00

## 2018-01-23 RX ADMIN — ASPIRIN 81 MG: 81 TABLET, COATED ORAL at 08:30

## 2018-01-23 RX ADMIN — SODIUM CHLORIDE 100 ML: 900 INJECTION, SOLUTION INTRAVENOUS at 21:39

## 2018-01-23 RX ADMIN — SIMVASTATIN 20 MG: 20 TABLET, FILM COATED ORAL at 22:07

## 2018-01-23 RX ADMIN — DOCUSATE SODIUM 100 MG: 100 CAPSULE, LIQUID FILLED ORAL at 17:14

## 2018-01-23 RX ADMIN — PIPERACILLIN AND TAZOBACTAM 10.12 G: 3; .375 INJECTION, POWDER, FOR SOLUTION INTRAVENOUS at 12:00

## 2018-01-23 RX ADMIN — IOPAMIDOL 70 ML: 755 INJECTION, SOLUTION INTRAVENOUS at 21:39

## 2018-01-23 RX ADMIN — IRON SUCROSE 200 MG: 20 INJECTION, SOLUTION INTRAVENOUS at 17:13

## 2018-01-23 RX ADMIN — VANCOMYCIN HYDROCHLORIDE 1000 MG: 10 INJECTION, POWDER, LYOPHILIZED, FOR SOLUTION INTRAVENOUS at 04:26

## 2018-01-23 RX ADMIN — ACETAMINOPHEN 650 MG: 325 TABLET, FILM COATED ORAL at 17:18

## 2018-01-23 NOTE — PROGRESS NOTES
Problem: Falls - Risk of  Goal: *Absence of Falls  Document Lázaro Fall Risk and appropriate interventions in the flowsheet. Outcome: Progressing Towards Goal  Fall Risk Interventions:  Mobility Interventions: Communicate number of staff needed for ambulation/transfer, Patient to call before getting OOB, PT Consult for mobility concerns, PT Consult for assist device competence, Strengthening exercises (ROM-active/passive), Utilize walker, cane, or other assitive device    Mentation Interventions: Adequate sleep, hydration, pain control, Door open when patient unattended, More frequent rounding, Reorient patient, Room close to nurse's station, Update white board    Medication Interventions: Patient to call before getting OOB, Teach patient to arise slowly    Elimination Interventions: Call light in reach, Patient to call for help with toileting needs     Call bell within reach, bed in lowest position, hourly rounds performed         Bedside shift change report given to Rocky Beth RN (oncoming nurse) by Yesenia Jimenez RN (offgoing nurse). Report included the following information SBAR, Kardex, ED Summary, Intake/Output, MAR, Recent Results and Cardiac Rhythm NSR.

## 2018-01-23 NOTE — PROGRESS NOTES
Problem: Diabetes Self-Management  Goal: *Prevention, detection, treatment of acute complications  List symptoms of hyper- and hypoglycemia; describe how to treat low blood sugar and actions for lowering  high blood glucose level.    Outcome: Progressing Towards Goal  Need to change insulin to high sensitivity r/t two drops in blood sugar during this admission following 5 units of insulin

## 2018-01-23 NOTE — PROGRESS NOTES
0009: Dr Marina Gan paged re: /69. No PRNs ordered to give. 0037: Dr. Marina Gan returned page. Pt info given via SBAR format. Rec'd telephone orders for 10mg IV hydralazine q6 hours PRN for SBP >170. Recycled BP cuff, now 168/65.

## 2018-01-23 NOTE — PROGRESS NOTES
1330: TRANSFER - IN REPORT:    Verbal report received from Micha Oropeza RN(name) on Hawk Randhawa  being received from ICU(unit) for routine progression of care      Report consisted of patients Situation, Background, Assessment and   Recommendations(SBAR). Information from the following report(s) SBAR, Kardex, ED Summary, Intake/Output, MAR, Recent Results and Cardiac Rhythm NSR was reviewed with the receiving nurse. Opportunity for questions and clarification was provided. Assessment completed upon patients arrival to unit and care assumed.        Primary Nurse Mónica Connor and Freddy Rod, RN performed a dual skin assessment on this patient No impairment noted  Parth score is 18

## 2018-01-23 NOTE — PROGRESS NOTES
Hospitalist Progress Note  Candice Richter MD  Office: [phone]        Date of Service:  2018  NAME:  Otilia Will  :  1934  MRN:  [id number]      Admission Summary:    80 y.o male with medical history of type II DM, BPH, hypertension, and Hyperlipidemia presented to the hospital with a chief complaint of AMS and fever of one day duration. Denied of having cough chills and rigor prior to this incident. Labs reviewed with significantly elevated lactate which trended down. Interval history / Subjective:     Patient is a [place] speaking, interview done a friend whom is translating. Assessment & Plan:     AMS secondary to Sepsis syndrome. Mental status better, patient to communicate. Eating improving. Will current abx. Treatment for one more day, in AM will dsicontinue all antibiotic and observed on abx       Microcytic anemia   Iron studies more looks consistent with iron deficiency anemia   IV  Iron, if not done will need a colonoscopy.     History of BPH   Continue Tamsulosin      Hypertension   On Norvasc and PRN Hydralazine  Decrease IV fluid.     Type II DM   AC HS coverage   Continue with SSI   Hold oral hypoglycemic for now     Code status: full  DVT prophylaxis: heparin    Care Plan discussed with: Patient/Family  Disposition: TBD     Hospital Problems  Date Reviewed: 2018          Codes Class Noted POA    * (Principal)Acute delirium ICD-10-CM: R41.0  ICD-9-CM: 780.09  2018 Yes                Review of Systems:   Pertinent items are noted in HPI. Vital Signs:    Last 24hrs VS reviewed since prior progress note.  Most recent are:  Visit Vitals    /66 (BP 1 Location: Left arm, BP Patient Position: At rest)    Pulse 71    Temp 97.8 °F (36.6 °C)    Resp 19    Ht 5' (1.524 m)    Wt 55.6 kg (122 lb 9.2 oz)    SpO2 96%    BMI 23.94 kg/m2         Intake/Output Summary (Last 24 hours) at 01/23/18 1614  Last data filed at 01/23/18 1200   Gross per 24 hour   Intake             1075 ml   Output             1800 ml   Net             -725 ml        Physical Examination:             Constitutional:  No acute distress, cooperative, pleasant    ENT:  Oral mucous moist, oropharynx benign. Neck supple,    Resp:  CTA bilaterally. No wheezing/rhonchi/rales. No accessory muscle use   CV:  Regular rhythm, normal rate, no murmurs, gallops, rubs    GI:  Soft, non distended, non tender. normoactive bowel sounds, no hepatosplenomegaly     Musculoskeletal:  No edema, warm, 2+ pulses throughout    Neurologic:  Moves all extremities. AAOx3, CN II-XII reviewed            Data Review:    Review and/or order of clinical lab test      Labs:     Recent Labs      01/23/18   0427  01/22/18   0230   01/21/18   0602   WBC   --   5.8   --   7.6   HGB  8.9*  7.4*   < >  8.2*   HCT  28.5*  23.7*   < >  25.9*   PLT   --   227   --   253    < > = values in this interval not displayed. Recent Labs      01/23/18   0427  01/22/18   0230  01/21/18   0602   NA  140  138  133*   K  3.3*  3.8  5.3*   CL  106  106  100   CO2  25  26  27   BUN  9  16  18   CREA  1.26  1.33*  1.30   GLU  143*  239*  259*   CA  8.7  7.6*  8.3*   MG   --    --   1.7   PHOS   --    --   3.1     Recent Labs      01/21/18   0602  01/21/18   0017   SGOT  34  16   ALT  13  12   AP  41*  53   TBILI  0.4  0.4   TP  7.0  8.5*   ALB  2.9*  3.6   GLOB  4.1*  4.9*   AML  38   --    LPSE  161   --      No results for input(s): INR, PTP, APTT in the last 72 hours. No lab exists for component: INREXT   Recent Labs      01/21/18   0530   TIBC  397   PSAT  8*   FERR  9*      Lab Results   Component Value Date/Time    Folate 26.5 01/21/2018 05:30 AM      No results for input(s): PH, PCO2, PO2 in the last 72 hours.   Recent Labs      01/21/18   1354  01/21/18   0602  01/21/18   0530   CPK  232  287   --    CKNDX  0.4  0.4   --    TROIQ  0.04  <0.04  <0.04     Lab Results Component Value Date/Time    Cholesterol, total 129 01/21/2018 06:02 AM    HDL Cholesterol 70 01/21/2018 06:02 AM    LDL, calculated 50.2 01/21/2018 06:02 AM    Triglyceride 44 01/21/2018 06:02 AM    CHOL/HDL Ratio 1.8 01/21/2018 06:02 AM     Lab Results   Component Value Date/Time    Glucose (POC) 144 01/23/2018 11:31 AM    Glucose (POC) 150 01/23/2018 06:49 AM    Glucose (POC) 148 01/22/2018 10:50 PM    Glucose (POC) 143 01/22/2018 05:40 PM    Glucose (POC) 64 01/22/2018 05:25 PM     Lab Results   Component Value Date/Time    Color YELLOW/STRAW 01/21/2018 12:37 AM    Appearance CLEAR 01/21/2018 12:37 AM    Specific gravity 1.016 01/21/2018 12:37 AM    pH (UA) 7.5 01/21/2018 12:37 AM    Protein 100 01/21/2018 12:37 AM    Glucose >1000 01/21/2018 12:37 AM    Ketone NEGATIVE  01/21/2018 12:37 AM    Bilirubin NEGATIVE  01/21/2018 12:37 AM    Urobilinogen 0.2 01/21/2018 12:37 AM    Nitrites NEGATIVE  01/21/2018 12:37 AM    Leukocyte Esterase NEGATIVE  01/21/2018 12:37 AM    Epithelial cells FEW 01/21/2018 12:37 AM    Bacteria NEGATIVE  01/21/2018 12:37 AM    WBC 0-4 01/21/2018 12:37 AM    RBC 0-5 01/21/2018 12:37 AM         Medications Reviewed:     Current Facility-Administered Medications   Medication Dose Route Frequency    hydrALAZINE (APRESOLINE) 20 mg/mL injection 10 mg  10 mg IntraVENous Q6H PRN    [START ON 1/24/2018] vancomycin trough on 1/24 @ 04:30 - draw 30 minutes before dose due at 05:00   Other ONCE    [START ON 1/24/2018] amLODIPine (NORVASC) tablet 10 mg  10 mg Oral DAILY    hydroCHLOROthiazide (HYDRODIURIL) tablet 12.5 mg  12.5 mg Oral DAILY    0.9% sodium chloride infusion  75 mL/hr IntraVENous CONTINUOUS    simvastatin (ZOCOR) tablet 20 mg  20 mg Oral QHS    psyllium husk-aspartame (METAMUCIL FIBER) packet 1 Packet  1 Packet Oral DAILY    tamsulosin (FLOMAX) capsule 0.4 mg  0.4 mg Oral QHS    triamcinolone acetonide (KENALOG) 0.1 % cream   Topical BID    sodium chloride (NS) flush 5-10 mL  5-10 mL IntraVENous Q8H    sodium chloride (NS) flush 5-10 mL  5-10 mL IntraVENous PRN    ondansetron (ZOFRAN) injection 4 mg  4 mg IntraVENous Q4H PRN    docusate sodium (COLACE) capsule 100 mg  100 mg Oral BID    heparin (porcine) injection 5,000 Units  5,000 Units SubCUTAneous Q8H    lactobac ac& pc-s.therm-b.anim (ROSARIO Q/RISAQUAD)  1 Cap Oral DAILY    insulin lispro (HUMALOG) injection   SubCUTAneous AC&HS    glucose chewable tablet 16 g  4 Tab Oral PRN    dextrose (D50W) injection syrg 12.5-25 g  12.5-25 g IntraVENous PRN    glucagon (GLUCAGEN) injection 1 mg  1 mg IntraMUSCular PRN    Vancomycin Pharmacy to Dose   Other Rx Dosing/Monitoring    piperacillin-tazobactam (ZOSYN) 10.125 g in  mL continuous 24 hr infusion  10.125 g IntraVENous Q24H    acetaminophen (TYLENOL) suppository 650 mg  650 mg Rectal Q4H PRN    ferrous sulfate tablet 325 mg  1 Tab Oral DAILY WITH BREAKFAST    vancomycin (VANCOCIN) 1000 mg in  ml infusion  1,000 mg IntraVENous Q24H    aspirin delayed-release tablet 81 mg  81 mg Oral DAILY    acetaminophen (TYLENOL) tablet 650 mg  650 mg Oral Q4H PRN     ______________________________________________________________________  EXPECTED LENGTH OF STAY: 3d 16h  ACTUAL LENGTH OF STAY:          2                 Demetri Davison MD

## 2018-01-23 NOTE — DIABETES MGMT
DTC Progress Note    Recommendations/ Comments: Chart reviewed for continued hypoglycemia yesterday (62 mg/dL @ 1710) most likely due to 5 units of correction given at lunch. If appropriate, please consider:   -  Changing correction scale to high sensitivity      Current hospital DM medication: Lispro for correction, normal sensitivity    Patient is a 80 y.o. male with a history of Type 2 Diabetes on oral agents (dual therapy): glimepiride (Amaryl), metformin (generic) at home. A1c:   Lab Results   Component Value Date/Time    Hemoglobin A1c 8.5 01/15/2018 09:41 AM    Hemoglobin A1c 7.7 11/08/2017 11:56 AM       Recent Glucose Results:   Lab Results   Component Value Date/Time     (H) 01/23/2018 04:27 AM    GLUCPOC 150 (H) 01/23/2018 06:49 AM    GLUCPOC 148 (H) 01/22/2018 10:50 PM    GLUCPOC 143 (H) 01/22/2018 05:40 PM        Lab Results   Component Value Date/Time    Creatinine 1.26 01/23/2018 04:27 AM     Estimated Creatinine Clearance: 30.9 mL/min (based on Cr of 1.26). Active Orders   Diet    DIET DIABETIC CONSISTENT CARB Regular; 2 GM NA (House Low NA)        PO intake:   Patient Vitals for the past 72 hrs:   % Diet Eaten   01/21/18 2200 100 %       Will continue to follow as needed.     Thank you  Kuldeep Salter, MS, RN, CDE

## 2018-01-24 LAB
DATE LAST DOSE: ABNORMAL
GLUCOSE BLD STRIP.AUTO-MCNC: 108 MG/DL (ref 65–100)
GLUCOSE BLD STRIP.AUTO-MCNC: 126 MG/DL (ref 65–100)
GLUCOSE BLD STRIP.AUTO-MCNC: 146 MG/DL (ref 65–100)
GLUCOSE BLD STRIP.AUTO-MCNC: 206 MG/DL (ref 65–100)
HCT VFR BLD AUTO: 29.3 % (ref 36.6–50.3)
HGB BLD-MCNC: 9.1 G/DL (ref 12.1–17)
REPORTED DOSE,DOSE: ABNORMAL UNITS
REPORTED DOSE/TIME,TMG: ABNORMAL
SERVICE CMNT-IMP: ABNORMAL
VANCOMYCIN TROUGH SERPL-MCNC: 11.6 UG/ML (ref 5–10)

## 2018-01-24 PROCEDURE — 74011250636 HC RX REV CODE- 250/636: Performed by: INTERNAL MEDICINE

## 2018-01-24 PROCEDURE — 65270000029 HC RM PRIVATE

## 2018-01-24 PROCEDURE — 36415 COLL VENOUS BLD VENIPUNCTURE: CPT | Performed by: INTERNAL MEDICINE

## 2018-01-24 PROCEDURE — 85018 HEMOGLOBIN: CPT | Performed by: INTERNAL MEDICINE

## 2018-01-24 PROCEDURE — G8979 MOBILITY GOAL STATUS: HCPCS

## 2018-01-24 PROCEDURE — G8978 MOBILITY CURRENT STATUS: HCPCS

## 2018-01-24 PROCEDURE — 74011000258 HC RX REV CODE- 258: Performed by: INTERNAL MEDICINE

## 2018-01-24 PROCEDURE — 74011636637 HC RX REV CODE- 636/637: Performed by: INTERNAL MEDICINE

## 2018-01-24 PROCEDURE — 74011250637 HC RX REV CODE- 250/637: Performed by: INTERNAL MEDICINE

## 2018-01-24 PROCEDURE — 82962 GLUCOSE BLOOD TEST: CPT

## 2018-01-24 PROCEDURE — 80202 ASSAY OF VANCOMYCIN: CPT | Performed by: INTERNAL MEDICINE

## 2018-01-24 PROCEDURE — G8980 MOBILITY D/C STATUS: HCPCS

## 2018-01-24 PROCEDURE — 97161 PT EVAL LOW COMPLEX 20 MIN: CPT

## 2018-01-24 RX ORDER — METFORMIN HYDROCHLORIDE 500 MG/1
1000 TABLET ORAL 2 TIMES DAILY WITH MEALS
Status: DISCONTINUED | OUTPATIENT
Start: 2018-01-26 | End: 2018-01-25 | Stop reason: HOSPADM

## 2018-01-24 RX ORDER — GLIPIZIDE 5 MG/1
10 TABLET ORAL
Status: DISCONTINUED | OUTPATIENT
Start: 2018-01-24 | End: 2018-01-25 | Stop reason: HOSPADM

## 2018-01-24 RX ORDER — HYDRALAZINE HYDROCHLORIDE 10 MG/1
10 TABLET, FILM COATED ORAL 2 TIMES DAILY
Status: DISCONTINUED | OUTPATIENT
Start: 2018-01-24 | End: 2018-01-25 | Stop reason: HOSPADM

## 2018-01-24 RX ORDER — VANCOMYCIN HYDROCHLORIDE
1250 EVERY 24 HOURS
Status: DISCONTINUED | OUTPATIENT
Start: 2018-01-25 | End: 2018-01-24

## 2018-01-24 RX ORDER — ATENOLOL 25 MG/1
50 TABLET ORAL DAILY
Status: DISCONTINUED | OUTPATIENT
Start: 2018-01-24 | End: 2018-01-25 | Stop reason: HOSPADM

## 2018-01-24 RX ADMIN — Medication 10 ML: at 21:39

## 2018-01-24 RX ADMIN — PSYLLIUM HUSK 1 PACKET: 3.4 POWDER ORAL at 09:14

## 2018-01-24 RX ADMIN — SIMVASTATIN 20 MG: 20 TABLET, FILM COATED ORAL at 21:39

## 2018-01-24 RX ADMIN — Medication 10 ML: at 13:03

## 2018-01-24 RX ADMIN — HYDRALAZINE HYDROCHLORIDE 10 MG: 10 TABLET, FILM COATED ORAL at 20:11

## 2018-01-24 RX ADMIN — VANCOMYCIN HYDROCHLORIDE 1000 MG: 10 INJECTION, POWDER, LYOPHILIZED, FOR SOLUTION INTRAVENOUS at 05:53

## 2018-01-24 RX ADMIN — AMLODIPINE BESYLATE 10 MG: 5 TABLET ORAL at 09:12

## 2018-01-24 RX ADMIN — HEPARIN SODIUM 5000 UNITS: 5000 INJECTION, SOLUTION INTRAVENOUS; SUBCUTANEOUS at 13:03

## 2018-01-24 RX ADMIN — DOCUSATE SODIUM 100 MG: 100 CAPSULE, LIQUID FILLED ORAL at 09:12

## 2018-01-24 RX ADMIN — HYDROCHLOROTHIAZIDE 12.5 MG: 25 TABLET ORAL at 09:12

## 2018-01-24 RX ADMIN — ATENOLOL 50 MG: 25 TABLET ORAL at 09:12

## 2018-01-24 RX ADMIN — GLIPIZIDE 10 MG: 5 TABLET ORAL at 07:43

## 2018-01-24 RX ADMIN — INSULIN LISPRO 2 UNITS: 100 INJECTION, SOLUTION INTRAVENOUS; SUBCUTANEOUS at 17:28

## 2018-01-24 RX ADMIN — IRON SUCROSE 200 MG: 20 INJECTION, SOLUTION INTRAVENOUS at 17:28

## 2018-01-24 RX ADMIN — HEPARIN SODIUM 5000 UNITS: 5000 INJECTION, SOLUTION INTRAVENOUS; SUBCUTANEOUS at 06:03

## 2018-01-24 RX ADMIN — ASPIRIN 81 MG: 81 TABLET, COATED ORAL at 09:13

## 2018-01-24 RX ADMIN — FERROUS SULFATE TAB 325 MG (65 MG ELEMENTAL FE) 325 MG: 325 (65 FE) TAB at 09:13

## 2018-01-24 RX ADMIN — TAMSULOSIN HYDROCHLORIDE 0.4 MG: 0.4 CAPSULE ORAL at 21:39

## 2018-01-24 RX ADMIN — Medication 1 CAPSULE: at 09:13

## 2018-01-24 RX ADMIN — HEPARIN SODIUM 5000 UNITS: 5000 INJECTION, SOLUTION INTRAVENOUS; SUBCUTANEOUS at 20:22

## 2018-01-24 NOTE — PROGRESS NOTES
Bedside shift change report given to Rafael Sanedrson RN (oncoming nurse) by Shavonne Paige RN (offgoing nurse). Report included the following information SBAR, Kardex, ED Summary, Procedure Summary, Intake/Output, MAR and Cardiac Rhythm NSR.

## 2018-01-24 NOTE — PROGRESS NOTES
physical Therapy EVALUATION/DISCHARGE  Patient: Graham Barone (35 y.o. male)  Date: 1/24/2018  Primary Diagnosis: Acute delirium        Precautions:      ASSESSMENT :  Based on the objective data described below, the patient presents at functional baseline. Per son report patient was independent PTA. Today patient was independent with all bed mobility, transfers, and gait training with no LOB noted. Patient demos intact standing and sitting balance and had no mobility concerns. Patient ended session sitting at EOB with tray/lunch in front, all needs placed within reach, and RN notified of patient's status. PT discharge rec no services and will complete orders as skilled physical therapy is not indicated at this time. Please re-consult if patient's status changes. PLAN :  Discharge Recommendations: None  Further Equipment Recommendations for Discharge: none     SUBJECTIVE:   Patient said through son \"I do not need any help walking.     OBJECTIVE DATA SUMMARY:   HISTORY:    Past Medical History:   Diagnosis Date    Controlled type 2 diabetes mellitus without complication, without long-term current use of insulin ([facility] [address] 10/27/2017    Essential hypertension 10/27/2017    Mixed hyperlipidemia 10/27/2017   History reviewed. No pertinent surgical history. Prior Level of Function/Home Situation: independent  Personal factors and/or comorbidities impacting plan of care:          EXAMINATION/PRESENTATION/DECISION MAKING:   Critical Behavior:  Neurologic State: Alert, Confused (hard to assess orientation )  Orientation Level: Oriented to person, Other (Comment) (INEZ )  Cognition: Follows commands     Hearing:     Skin:    Edema:   Range Of Motion:  AROM: Within functional limits                       Strength:    Strength:  Within functional limits                    Tone & Sensation:                                  Coordination:  Coordination: Within functional limits  Vision:      Functional Mobility:  Bed Mobility:     Supine to Sit: Independent  Sit to Supine: Independent     Transfers:  Sit to Stand: Independent  Stand to Sit: Independent                       Balance:   Sitting: Intact  Standing: Intact  Ambulation/Gait Training:  Distance (ft): 50 Feet (ft)  Assistive Device: Gait belt  Ambulation - Level of Assistance: Independent        Gait Abnormalities: Decreased step clearance        Base of Support: Widened        Step Length: Right shortened;Left shortened                         Stairs: Therapeutic Exercises:       Functional Measure:  Tinetti test:    Sitting Balance: 1  Arises: 2  Attempts to Rise: 2  Immediate Standing Balance: 2  Standing Balance: 2  Nudged: 2  Eyes Closed: 1  Turn 360 Degrees - Continuous/Discontinuous: 1  Turn 360 Degrees - Steady/Unsteady: 1  Sitting Down: 2  Balance Score: 16  Indication of Gait: 1  R Step Length/Height: 1  L Step Length/Height: 1  R Foot Clearance: 1  L Foot Clearance: 1  Step Symmetry: 1  Step Continuity: 1  Path: 2  Trunk: 1  Walking Time: 1  Gait Score: 11  Total Score: 27       Tinetti Test and G-code impairment scale:  Percentage of Impairment CH    0%   CI    1-19% CJ    20-39% CK    40-59% CL    60-79% CM    80-99% CN     100%   Tinetti  Score 0-28 28 23-27 17-22 12-16 6-11 1-5 0       Tinetti Tool Score Risk of Falls  <19 = High Fall Risk  19-24 = Moderate Fall Risk  25-28 = Low Fall Risk  Tinetti ME. Performance-Oriented Assessment of Mobility Problems in Elderly Patients. [address] 66; [id number]. (Scoring Description: PT Bulletin Feb. 10, 1993)    Older adults: Zbigniew Fair et al, 2009; n = 1000 [facility] elderly evaluated with ABC, MAYELA, ADL, and IADL)  · Mean MAYELA score for males aged 69-68 years = 26.21(3.40)  · Mean MAYELA score for females age 69-68 years = 25.16(4.30)  · Mean MAYELA score for males over 80 years = 23.29(6.02)  · Mean MAYELA score for females over 80 years = 17.20(8.32)         G codes:   In compliance with CMSs Claims Based Outcome Reporting, the following G-code set was chosen for this patient based on their primary functional limitation being treated: The outcome measure chosen to determine the severity of the functional limitation was the Tinetti with a score of 27/28 which was correlated with the impairment scale. ? Mobility - Walking and Moving Around:     - CURRENT STATUS: CI - 1%-19% impaired, limited or restricted    - GOAL STATUS: CI - 1%-19% impaired, limited or restricted    - D/C STATUS:  CI - 1%-19% impaired, limited or restricted        Physical Therapy Evaluation Charge Determination   History Examination Presentation Decision-Making   MEDIUM  Complexity : 1-2 comorbidities / personal factors will impact the outcome/ POC  LOW Complexity : 1-2 Standardized tests and measures addressing body structure, function, activity limitation and / or participation in recreation  LOW Complexity : Stable, uncomplicated  Other outcome measures Tinetti  LOW       Based on the above components, the patient evaluation is determined to be of the following complexity level: LOW     Pain:           Activity Tolerance:   Good  Please refer to the flowsheet for vital signs taken during this treatment. After treatment:   []   Patient left in no apparent distress sitting up in chair  [x]   Patient left in no apparent distress in bed  [x]   Call bell left within reach  [x]   Nursing notified  []   Caregiver present  []   Bed alarm activated    COMMUNICATION/EDUCATION:   Communication/Collaboration:  [x]   Fall prevention education was provided and the patient/caregiver indicated understanding. [x]   Patient/family have participated as able and agree with findings and recommendations. []   Patient is unable to participate in plan of care at this time.   Findings and recommendations were discussed with: Registered Nurse    Thank you for this referral.  Fabio Castillo  PT, DPT   Time Calculation: 15 mins

## 2018-01-24 NOTE — PROGRESS NOTES
0000  Bedside and Verbal shift change report given to Montez Urena RN (oncoming nurse) by Jonathan Aguirre RN (offgoing nurse). Report included the following information SBAR, Kardex, ED Summary, Procedure Summary, Intake/Output, MAR, Recent Results and Cardiac Rhythm NSR. Hourly rounding performed.

## 2018-01-24 NOTE — PROGRESS NOTES
1145: Patient's left IV running zosyn found under patient. Attempted IVx1. Contacting Dr. David Cruz to determine if patient is discharging or staying in patient before attempting further IV access.

## 2018-01-24 NOTE — DIABETES MGMT
DTC Progress Note    Recommendations/ Comments: Chart reviewed for continued hyperglycemia. Blood sugars fasting 126 mg/dl this morning but post prandial are >180 mg/dl. Noted Glipizide 10 mg at breakfast and metformin 1000 mg BID ordered today. DTC to continue to follow. Current hospital DM medication: Lispro for correction, normal sensitivity, Glipizide 10 mg at breakfast and Metformin 1000 mg BID. Patient is a 80 y.o. male with a history of Type 2 Diabetes on oral agents (dual therapy): glimepiride (Amaryl), metformin (generic) at home. A1c:   Lab Results   Component Value Date/Time    Hemoglobin A1c 8.5 01/15/2018 09:41 AM    Hemoglobin A1c 7.7 11/08/2017 11:56 AM       Recent Glucose Results:   Lab Results   Component Value Date/Time    GLUCPOC 206 (H) 01/24/2018 11:34 AM    GLUCPOC 126 (H) 01/24/2018 07:42 AM    GLUCPOC 180 (H) 01/23/2018 09:22 PM        Lab Results   Component Value Date/Time    Creatinine 1.26 01/23/2018 04:27 AM     Estimated Creatinine Clearance: 30.9 mL/min (based on Cr of 1.26). Active Orders   Diet    DIET DIABETIC CONSISTENT CARB Regular; 2 GM NA (House Low NA)        PO intake:   Patient Vitals for the past 72 hrs:   % Diet Eaten   01/24/18 1133 100 %   01/21/18 2200 100 %       Will continue to follow as needed.     Thank you  Charissa Russ RD, CDE

## 2018-01-24 NOTE — PROGRESS NOTES
Hospitalist Progress Note  Colby Hall MD  Office: [phone]      Date of Service:  2018  NAME:  Philip Navas  :  1934  MRN:  [id number]      Admission Summary:     80 y.o male with medical history of type II DM, BPH, hypertension, and Hyperlipidemia presented to the hospital with a chief complaint of AMS and fever of one day duration. Denied of having cough chills and rigor prior to this incident. Labs reviewed with significantly elevated lactate which trended down. Interval history / Subjective:     Stable, develop and an episode of SVT, brief 13 beats. Assessment & Plan:     AMS secondary to Sepsis syndrome. Mental status better. Patient eating. Follow better direction          Microcytic anemia   Iron studies more looks consistent with iron deficiency anemia   IV  Iron, if not done will need a colonoscopy.      History of BPH   Continue Tamsulosin       Hypertension   On Norvasc and atenolol because his blood pressure still elevated  Decrease IV fluid.      Type II DM   AC HS coverage   Continue with SSI    oral hypoglycemic  Started Glipizide and I will  Re start his glucophage     Code status: full  DVT prophylaxis: Heparin    Care Plan discussed with: Patient/Family and Nurse  Disposition: TBD     Hospital Problems  Date Reviewed: 2018          Codes Class Noted POA    * (Principal)Acute delirium ICD-10-CM: R41.0  ICD-9-CM: 780.09  2018 Yes                Review of Systems:   Pertinent items are noted in HPI. Vital Signs:    Last 24hrs VS reviewed since prior progress note.  Most recent are:  Visit Vitals    /76 (BP 1 Location: Left arm, BP Patient Position: At rest)    Pulse 66    Temp 97.8 °F (36.6 °C)    Resp 22    Ht 5' (1.524 m)    Wt 56.7 kg (125 lb)    SpO2 95%    BMI 24.41 kg/m2         Intake/Output Summary (Last 24 hours) at 18 1221  Last data filed at 18 7653 Gross per 24 hour   Intake            342.5 ml   Output             1100 ml   Net           -757.5 ml        Physical Examination:             Constitutional:  No acute distress, cooperative, pleasant    ENT:  Oral mucous moist, oropharynx benign. Neck supple,    Resp:  CTA bilaterally. No wheezing/rhonchi/rales. No accessory muscle use   CV:  Regular rhythm, normal rate, no murmurs, gallops, rubs    GI:  Soft, non distended, non tender. normoactive bowel sounds, no hepatosplenomegaly     Musculoskeletal:  No edema, warm, 2+ pulses throughout    Neurologic:  Moves all extremities. AAOx3, CN II-XII reviewed            Data Review:    Review and/or order of clinical lab test      Labs:     Recent Labs      01/24/18   0117  01/23/18   0427  01/22/18   0230   WBC   --    --   5.8   HGB  9.1*  8.9*  7.4*   HCT  29.3*  28.5*  23.7*   PLT   --    --   227     Recent Labs      01/23/18   0427  01/22/18   0230   NA  140  138   K  3.3*  3.8   CL  106  106   CO2  25  26   BUN  9  16   CREA  1.26  1.33*   GLU  143*  239*   CA  8.7  7.6*     No results for input(s): SGOT, GPT, ALT, AP, TBIL, TBILI, TP, ALB, GLOB, GGT, AML, LPSE in the last 72 hours. No lab exists for component: AMYP, HLPSE  No results for input(s): INR, PTP, APTT in the last 72 hours. No lab exists for component: INREXT   No results for input(s): FE, TIBC, PSAT, FERR in the last 72 hours. Lab Results   Component Value Date/Time    Folate 26.5 01/21/2018 05:30 AM      No results for input(s): PH, PCO2, PO2 in the last 72 hours.   Recent Labs      01/21/18   1354   CPK  232   CKNDX  0.4   TROIQ  0.04     Lab Results   Component Value Date/Time    Cholesterol, total 129 01/21/2018 06:02 AM    HDL Cholesterol 70 01/21/2018 06:02 AM    LDL, calculated 50.2 01/21/2018 06:02 AM    Triglyceride 44 01/21/2018 06:02 AM    CHOL/HDL Ratio 1.8 01/21/2018 06:02 AM     Lab Results   Component Value Date/Time    Glucose (POC) 206 01/24/2018 11:34 AM    Glucose (POC) 126 01/24/2018 07:42 AM    Glucose (POC) 180 01/23/2018 09:22 PM    Glucose (POC) 221 01/23/2018 06:10 PM    Glucose (POC) 230 01/23/2018 05:04 PM     Lab Results   Component Value Date/Time    Color YELLOW/STRAW 01/21/2018 12:37 AM    Appearance CLEAR 01/21/2018 12:37 AM    Specific gravity 1.016 01/21/2018 12:37 AM    pH (UA) 7.5 01/21/2018 12:37 AM    Protein 100 01/21/2018 12:37 AM    Glucose >1000 01/21/2018 12:37 AM    Ketone NEGATIVE  01/21/2018 12:37 AM    Bilirubin NEGATIVE  01/21/2018 12:37 AM    Urobilinogen 0.2 01/21/2018 12:37 AM    Nitrites NEGATIVE  01/21/2018 12:37 AM    Leukocyte Esterase NEGATIVE  01/21/2018 12:37 AM    Epithelial cells FEW 01/21/2018 12:37 AM    Bacteria NEGATIVE  01/21/2018 12:37 AM    WBC 0-4 01/21/2018 12:37 AM    RBC 0-5 01/21/2018 12:37 AM         Medications Reviewed:     Current Facility-Administered Medications   Medication Dose Route Frequency    atenolol (TENORMIN) tablet 50 mg  50 mg Oral DAILY    glipiZIDE (GLUCOTROL) tablet 10 mg  10 mg Oral ACB    [START ON 1/25/2018] vancomycin (VANCOCIN) 1250 mg in  ml infusion  1,250 mg IntraVENous Q24H    hydrALAZINE (APRESOLINE) 20 mg/mL injection 10 mg  10 mg IntraVENous Q6H PRN    amLODIPine (NORVASC) tablet 10 mg  10 mg Oral DAILY    iron sucrose (VENOFER) 200 mg in 0.9% sodium chloride 100 mL IVPB  200 mg IntraVENous Q24H    hydroCHLOROthiazide (HYDRODIURIL) tablet 12.5 mg  12.5 mg Oral DAILY    0.9% sodium chloride infusion  75 mL/hr IntraVENous CONTINUOUS    simvastatin (ZOCOR) tablet 20 mg  20 mg Oral QHS    psyllium husk-aspartame (METAMUCIL FIBER) packet 1 Packet  1 Packet Oral DAILY    tamsulosin (FLOMAX) capsule 0.4 mg  0.4 mg Oral QHS    triamcinolone acetonide (KENALOG) 0.1 % cream   Topical BID    sodium chloride (NS) flush 5-10 mL  5-10 mL IntraVENous Q8H    sodium chloride (NS) flush 5-10 mL  5-10 mL IntraVENous PRN    ondansetron (ZOFRAN) injection 4 mg  4 mg IntraVENous Q4H PRN    docusate sodium (COLACE) capsule 100 mg  100 mg Oral BID    heparin (porcine) injection 5,000 Units  5,000 Units SubCUTAneous Q8H    lactobac ac& pc-s.therm-b.anim (ROSARIO Q/RISAQUAD)  1 Cap Oral DAILY    insulin lispro (HUMALOG) injection   SubCUTAneous AC&HS    glucose chewable tablet 16 g  4 Tab Oral PRN    dextrose (D50W) injection syrg 12.5-25 g  12.5-25 g IntraVENous PRN    glucagon (GLUCAGEN) injection 1 mg  1 mg IntraMUSCular PRN    Vancomycin Pharmacy to Dose   Other Rx Dosing/Monitoring    piperacillin-tazobactam (ZOSYN) 10.125 g in  mL continuous 24 hr infusion  10.125 g IntraVENous Q24H    acetaminophen (TYLENOL) suppository 650 mg  650 mg Rectal Q4H PRN    ferrous sulfate tablet 325 mg  1 Tab Oral DAILY WITH BREAKFAST    aspirin delayed-release tablet 81 mg  81 mg Oral DAILY    acetaminophen (TYLENOL) tablet 650 mg  650 mg Oral Q4H PRN     ______________________________________________________________________  EXPECTED LENGTH OF STAY: 3d 16h  ACTUAL LENGTH OF STAY:          3                 Demetri Davison MD

## 2018-01-24 NOTE — PROGRESS NOTES
Pharmacist Note - Vancomycin Dosing  Therapy day 4  Indication:  Sepsis  Current regimen:  Vancomycin 1000 mg IV q24h    A Trough Level resulted at 11.6 mcg/mL which was obtained 25.5 hrs post-dose. The extrapolated \"true\" trough is approximately 12.5 mcg/mL based on the patient's known kinetics. Goal trough: 15 - 20 mcg/mL     Plan: Change to vancomycin 1250 mg IV Q24h . Pharmacy will continue to monitor this patient daily for changes in clinical status and renal function.

## 2018-01-24 NOTE — PROGRESS NOTES
Bedside shift change report given to [address] (oncoming nurse) by Claire Altamirano RN (offgoing nurse). Report included the following information SBAR, Kardex, [facility], Recent Results and Cardiac Rhythm NSR.     08:30  Paged Dr. Arben Bernard to notify him that pt had a 13 beat run of vtach this morning at 0606. Just prior to that I had woken pt up to draw his labs, give heparin injection, and start his IV Vancomycin. Pt was visibly perturbed, frowning, and kept trying to put his arm back in the cover while I was attempting to get labs. He was hypertensive after this encounter. I was not in the room when he had the episode of vtach, but went to check on him as soon as I was notified about the episode. Pt was resting quietly in bed, calm, with a relaxed facial expression and body posture. Skin warm, dry, and appropriate color. Family member who identified himself as pt's grandson was in the room with him all night, and stated pt was moving around in bed, adjusting his pillows when the tele monitor was alarming during vtach episode. Claire Altamirano RN    08:50  Still waiting for return call from Dr. Arben Bernard.  Pt remains asymptomatic from v tach episode. No further episodes of Vtach thus far. Notified Rad Georges that Dr. Arben Bernard is still unaware of vtach epsiode at this point. Claire Altamirano RN

## 2018-01-24 NOTE — PROGRESS NOTES
Problem: Falls - Risk of  Goal: *Absence of Falls  Document Lázaro Fall Risk and appropriate interventions in the flowsheet. Outcome: Progressing Towards Goal  Patient has not fallen during shift. Call bell within reach. Tray table within reach. Bed in lowest position. Non-skid socks on patient's feet when ambulating. Appropriate lighting in room. Patient up with assistance. Speaks only [place]. Blue phone ineffective. Fall Risk Interventions:  Mobility Interventions: Bed/chair exit alarm, Communicate number of staff needed for ambulation/transfer, PT Consult for mobility concerns    Mentation Interventions: Bed/chair exit alarm, Evaluate medications/consider consulting pharmacy, Door open when patient unattended, Increase mobility, More frequent rounding, Reorient patient    Medication Interventions: Evaluate medications/consider consulting pharmacy, Patient to call before getting OOB, Teach patient to arise slowly    Elimination Interventions: Call light in reach, Elevated toilet seat, Patient to call for help with toileting needs, Toileting schedule/hourly rounds             Problem: Pressure Injury - Risk of  Goal: *Prevention of pressure ulcer  Outcome: Progressing Towards Goal  Patient has not fallen during shift. Call bell within reach. Tray table within reach. Bed in lowest position. Non-skid socks on patient's feet when ambulating. Appropriate lighting in room. Patient up with one assist and able to turn self. Problem: Sepsis: Day 3  Goal: Treatments/Interventions/Procedures  Outcome: Progressing Towards Goal  Continuous zosyn infusing. Patient asymptomatic of sepsis at this time.

## 2018-01-25 VITALS
HEART RATE: 66 BPM | RESPIRATION RATE: 19 BRPM | WEIGHT: 124.78 LBS | HEIGHT: 60 IN | SYSTOLIC BLOOD PRESSURE: 147 MMHG | TEMPERATURE: 98.4 F | OXYGEN SATURATION: 96 % | DIASTOLIC BLOOD PRESSURE: 62 MMHG | BODY MASS INDEX: 24.5 KG/M2

## 2018-01-25 LAB
GLUCOSE BLD STRIP.AUTO-MCNC: 153 MG/DL (ref 65–100)
GLUCOSE BLD STRIP.AUTO-MCNC: 189 MG/DL (ref 65–100)
GLUCOSE BLD STRIP.AUTO-MCNC: 64 MG/DL (ref 65–100)
SERVICE CMNT-IMP: ABNORMAL

## 2018-01-25 PROCEDURE — 74011250637 HC RX REV CODE- 250/637: Performed by: INTERNAL MEDICINE

## 2018-01-25 PROCEDURE — 74011250636 HC RX REV CODE- 250/636: Performed by: INTERNAL MEDICINE

## 2018-01-25 PROCEDURE — 74011000250 HC RX REV CODE- 250: Performed by: INTERNAL MEDICINE

## 2018-01-25 PROCEDURE — 82962 GLUCOSE BLOOD TEST: CPT

## 2018-01-25 PROCEDURE — 74011636637 HC RX REV CODE- 636/637: Performed by: INTERNAL MEDICINE

## 2018-01-25 RX ORDER — CLONIDINE HYDROCHLORIDE 0.1 MG/1
0.1 TABLET ORAL
Status: COMPLETED | OUTPATIENT
Start: 2018-01-25 | End: 2018-01-25

## 2018-01-25 RX ORDER — AMLODIPINE BESYLATE 10 MG/1
10 TABLET ORAL DAILY
Qty: 30 TAB | Refills: 6 | Status: SHIPPED | OUTPATIENT
Start: 2018-01-26 | End: 2018-03-13 | Stop reason: SDUPTHER

## 2018-01-25 RX ORDER — HYDRALAZINE HYDROCHLORIDE 10 MG/1
10 TABLET, FILM COATED ORAL 3 TIMES DAILY
Qty: 90 TAB | Refills: 6 | Status: SHIPPED | OUTPATIENT
Start: 2018-01-25 | End: 2018-03-13 | Stop reason: SDUPTHER

## 2018-01-25 RX ORDER — ATENOLOL 50 MG/1
50 TABLET ORAL DAILY
Qty: 30 TAB | Refills: 6 | Status: SHIPPED | OUTPATIENT
Start: 2018-01-26 | End: 2018-03-13 | Stop reason: SDUPTHER

## 2018-01-25 RX ADMIN — HYDRALAZINE HYDROCHLORIDE 10 MG: 10 TABLET, FILM COATED ORAL at 09:50

## 2018-01-25 RX ADMIN — HYDRALAZINE HYDROCHLORIDE 10 MG: 20 INJECTION INTRAMUSCULAR; INTRAVENOUS at 01:42

## 2018-01-25 RX ADMIN — PSYLLIUM HUSK 1 PACKET: 3.4 POWDER ORAL at 09:51

## 2018-01-25 RX ADMIN — HEPARIN SODIUM 5000 UNITS: 5000 INJECTION, SOLUTION INTRAVENOUS; SUBCUTANEOUS at 04:43

## 2018-01-25 RX ADMIN — DEXTROSE MONOHYDRATE 25 G: 500 INJECTION PARENTERAL at 06:56

## 2018-01-25 RX ADMIN — AMLODIPINE BESYLATE 10 MG: 5 TABLET ORAL at 09:51

## 2018-01-25 RX ADMIN — Medication 10 ML: at 06:59

## 2018-01-25 RX ADMIN — INSULIN LISPRO 2 UNITS: 100 INJECTION, SOLUTION INTRAVENOUS; SUBCUTANEOUS at 11:24

## 2018-01-25 RX ADMIN — CLONIDINE HYDROCHLORIDE 0.1 MG: 0.1 TABLET ORAL at 04:42

## 2018-01-25 RX ADMIN — ATENOLOL 50 MG: 25 TABLET ORAL at 09:50

## 2018-01-25 RX ADMIN — FERROUS SULFATE TAB 325 MG (65 MG ELEMENTAL FE) 325 MG: 325 (65 FE) TAB at 09:53

## 2018-01-25 RX ADMIN — HYDROCHLOROTHIAZIDE 12.5 MG: 25 TABLET ORAL at 09:49

## 2018-01-25 RX ADMIN — DOCUSATE SODIUM 100 MG: 100 CAPSULE, LIQUID FILLED ORAL at 09:50

## 2018-01-25 RX ADMIN — Medication 1 CAPSULE: at 09:50

## 2018-01-25 RX ADMIN — ASPIRIN 81 MG: 81 TABLET, COATED ORAL at 09:49

## 2018-01-25 NOTE — PROGRESS NOTES
Hospitalist Progress Note  Micki Tucker MD  Office: [phone]      Date of Service:  2018  NAME:  Julien Tran  :  1934  MRN:  [id number]      Admission Summary:   80 y.o male with medical history of type II DM, BPH, hypertension, and Hyperlipidemia presented to the hospital with a chief complaint of AMS and fever of one day duration. Denied of having cough chills and rigor prior to this incident. Labs reviewed with significantly elevated lactate which trended down. Interval history / Subjective:     Stable. Patient resting      Assessment & Plan:     AMS secondary to Sepsis syndrome.   Mental status better. Patient eating. Follow better direction           Microcytic anemia   Iron studies more looks consistent with iron deficiency anemia   IV  Iron, if not done will need a colonoscopy.      History of BPH   Continue Tamsulosin       Hypertension   On Norvasc and atenolol because his blood pressure still elevated  Need to monitor blood pressure in order to have long tern control      Type II DM   AC HS coverage   Continue with SSI    oral hypoglycemic    Code status:   DVT prophylaxis: heparin    Care Plan discussed with: Patient/Family, Nurse and   Disposition: TBD     Hospital Problems  Date Reviewed: 2018          Codes Class Noted POA    * (Principal)Acute delirium ICD-10-CM: R41.0  ICD-9-CM: 780.09  2018 Yes                Review of Systems:   Pertinent items are noted in HPI. Vital Signs:    Last 24hrs VS reviewed since prior progress note.  Most recent are:  Visit Vitals    /62 (BP 1 Location: Left arm, BP Patient Position: At rest)    Pulse 66    Temp 98.4 °F (36.9 °C)    Resp 19    Ht 5' (1.524 m)    Wt 56.6 kg (124 lb 12.5 oz)    SpO2 96%    BMI 24.37 kg/m2         Intake/Output Summary (Last 24 hours) at 18 1253  Last data filed at 18 0445   Gross per 24 hour Intake                0 ml   Output              850 ml   Net             -850 ml        Physical Examination:             Constitutional:  No acute distress, cooperative, pleasant    ENT:  Oral mucous moist, oropharynx benign. Neck supple,    Resp:  CTA bilaterally. No wheezing/rhonchi/rales. No accessory muscle use   CV:  Regular rhythm, normal rate, no murmurs, gallops, rubs    GI:  Soft, non distended, non tender. normoactive bowel sounds, no hepatosplenomegaly     Musculoskeletal:  No edema, warm, 2+ pulses throughout    Neurologic:  Moves all extremities. AAOx3, CN II-XII reviewed            Data Review:    Review and/or order of clinical lab test      Labs:     Recent Labs      01/24/18   0117  01/23/18 0427   HGB  9.1*  8.9*   HCT  29.3*  28.5*     Recent Labs      01/23/18 0427   NA  140   K  3.3*   CL  106   CO2  25   BUN  9   CREA  1.26   GLU  143*   CA  8.7     No results for input(s): SGOT, GPT, ALT, AP, TBIL, TBILI, TP, ALB, GLOB, GGT, AML, LPSE in the last 72 hours. No lab exists for component: AMYP, HLPSE  No results for input(s): INR, PTP, APTT in the last 72 hours. No lab exists for component: INREXT   No results for input(s): FE, TIBC, PSAT, FERR in the last 72 hours. Lab Results   Component Value Date/Time    Folate 26.5 01/21/2018 05:30 AM      No results for input(s): PH, PCO2, PO2 in the last 72 hours. No results for input(s): CPK, CKNDX, TROIQ in the last 72 hours.     No lab exists for component: CPKMB  Lab Results   Component Value Date/Time    Cholesterol, total 129 01/21/2018 06:02 AM    HDL Cholesterol 70 01/21/2018 06:02 AM    LDL, calculated 50.2 01/21/2018 06:02 AM    Triglyceride 44 01/21/2018 06:02 AM    CHOL/HDL Ratio 1.8 01/21/2018 06:02 AM     Lab Results   Component Value Date/Time    Glucose (POC) 189 01/25/2018 10:54 AM    Glucose (POC) 153 01/25/2018 07:13 AM    Glucose (POC) 64 01/25/2018 06:52 AM    Glucose (POC) 108 01/24/2018 09:12 PM    Glucose (POC) 146 01/24/2018 04:40 PM     Lab Results   Component Value Date/Time    Color YELLOW/STRAW 01/21/2018 12:37 AM    Appearance CLEAR 01/21/2018 12:37 AM    Specific gravity 1.016 01/21/2018 12:37 AM    pH (UA) 7.5 01/21/2018 12:37 AM    Protein 100 01/21/2018 12:37 AM    Glucose >1000 01/21/2018 12:37 AM    Ketone NEGATIVE  01/21/2018 12:37 AM    Bilirubin NEGATIVE  01/21/2018 12:37 AM    Urobilinogen 0.2 01/21/2018 12:37 AM    Nitrites NEGATIVE  01/21/2018 12:37 AM    Leukocyte Esterase NEGATIVE  01/21/2018 12:37 AM    Epithelial cells FEW 01/21/2018 12:37 AM    Bacteria NEGATIVE  01/21/2018 12:37 AM    WBC 0-4 01/21/2018 12:37 AM    RBC 0-5 01/21/2018 12:37 AM         Medications Reviewed:     Current Facility-Administered Medications   Medication Dose Route Frequency    atenolol (TENORMIN) tablet 50 mg  50 mg Oral DAILY    glipiZIDE (GLUCOTROL) tablet 10 mg  10 mg Oral ACB    [START ON 1/26/2018] metFORMIN (GLUCOPHAGE) tablet 1,000 mg  1,000 mg Oral BID WITH MEALS    hydrALAZINE (APRESOLINE) tablet 10 mg  10 mg Oral BID    hydrALAZINE (APRESOLINE) 20 mg/mL injection 10 mg  10 mg IntraVENous Q6H PRN    amLODIPine (NORVASC) tablet 10 mg  10 mg Oral DAILY    hydroCHLOROthiazide (HYDRODIURIL) tablet 12.5 mg  12.5 mg Oral DAILY    0.9% sodium chloride infusion  75 mL/hr IntraVENous CONTINUOUS    simvastatin (ZOCOR) tablet 20 mg  20 mg Oral QHS    psyllium husk-aspartame (METAMUCIL FIBER) packet 1 Packet  1 Packet Oral DAILY    tamsulosin (FLOMAX) capsule 0.4 mg  0.4 mg Oral QHS    triamcinolone acetonide (KENALOG) 0.1 % cream   Topical BID    sodium chloride (NS) flush 5-10 mL  5-10 mL IntraVENous Q8H    sodium chloride (NS) flush 5-10 mL  5-10 mL IntraVENous PRN    ondansetron (ZOFRAN) injection 4 mg  4 mg IntraVENous Q4H PRN    docusate sodium (COLACE) capsule 100 mg  100 mg Oral BID    heparin (porcine) injection 5,000 Units  5,000 Units SubCUTAneous Q8H    [facility] ac& pc-s.therm-b.anim (ROSARIO Q/RISAQUAD)  1 Cap Oral DAILY    insulin lispro (HUMALOG) injection   SubCUTAneous AC&HS    glucose chewable tablet 16 g  4 Tab Oral PRN    dextrose (D50W) injection syrg 12.5-25 g  12.5-25 g IntraVENous PRN    glucagon (GLUCAGEN) injection 1 mg  1 mg IntraMUSCular PRN    acetaminophen (TYLENOL) suppository 650 mg  650 mg Rectal Q4H PRN    ferrous sulfate tablet 325 mg  1 Tab Oral DAILY WITH BREAKFAST    aspirin delayed-release tablet 81 mg  81 mg Oral DAILY    acetaminophen (TYLENOL) tablet 650 mg  650 mg Oral Q4H PRN     ______________________________________________________________________  EXPECTED LENGTH OF STAY: 3d 16h  ACTUAL LENGTH OF STAY:          4                 Rg Rdz MD

## 2018-01-25 NOTE — PROGRESS NOTES
Spiritual Care Partner Volunteer visited patient in Rm 403 on 1/25/2018.   Documented by:  Chaplain Thompson MDiv, MS, [facility]  [phone] ([id number])

## 2018-01-25 NOTE — DISCHARGE SUMMARY
[address]    DISCHARGE SUMMARY    Jessica House  MR#: [id number]  : 1934  ACCOUNT #: [de-identified]   ADMIT DATE: 2018  DISCHARGE DATE:     DISCHARGE DIAGNOSES:  Uncontrolled hypertension. Altered mental status. Microcytic anemia. Benign prostatic hypertrophy. Type 2 diabetes. Dyslipidemia. CONSULTS DONE:  None. PROCEDURES DONE:  None. HOSPITAL COURSE:  The patient is an 80-year-old patient from [facility], who is visiting his son, was admitted on 2018, after an episode of changes in the mental status. About 1 or 2 weeks prior to coming to the hospital, patient developed these findings that were associated also with decrease of sleep and also was having elevated temperature that was measured rectally, but otherwise asymptomatic. The patient was admitted initially as a sepsis protocol, but upon reviewing the septic workup done in the hospital, all the results were negative. Blood culture negative. Urine culture negative, as well as a RSV (respiratory syncytial vital) also was negative. During the stay, his antibiotic was stopped and monitored because his blood pressure has been the main issue that will be hard to control. Since, at least the , his blood pressure has been as high as 179 and the lowest has been 155,  for which several medications have been added to the patient's treatment in order to bring down the blood pressure under control. Today, his blood pressure is 147/62, this is the latest one and as such, we will discharge the patient. DISCHARGE MEDICATION:  Norvasc 10 mg daily, aspirin 81 mg daily, atenolol 50 mg daily, hydralazine 10 mg t.i.d., hydrochlorothiazide 12.5 daily, Glucophage 1000 b.i.d., Zocor 20 mg daily, Flomax 0.4 mg daily. DIET:  Diet has to be Diabetic diet. ACTIVITIES:  As tolerated. Patient to monitor his blood pressure on a daily basis. DISPOSITION:  Home with family.     CONDITION ON DISCHARGE: Stable.       MD MARLON Tesfaye/BAILEY  D: 01/25/2018 13:23     T: 01/25/2018 14:16  JOB #: [id number]

## 2018-01-25 NOTE — PROGRESS NOTES
Pt awake, A&O without complaints during bedside verbal report. TRANSFER - OUT REPORT:    Verbal report given to Brandi(name) on Lesa Hall  being transferred to [address] [place] [address] room 509(unit) for routine progression of care       Report consisted of patients Situation, Background, Assessment and   Recommendations(SBAR). Information from the following report(s) SBAR, Kardex, Intake/Output, MAR, Recent Results and Cardiac Rhythm SR was reviewed with the receiving nurse. Lines:   Peripheral IV 01/24/18 Right;Posterior Arm (Active)   Site Assessment Clean, dry, & intact 1/25/2018  8:15 AM   Phlebitis Assessment 0 1/25/2018  8:15 AM   Infiltration Assessment 0 1/25/2018  8:15 AM   Dressing Status Clean, dry, & intact 1/25/2018  8:15 AM   Dressing Type Transparent;Tape 1/25/2018  8:15 AM   Hub Color/Line Status Blue; Infusing 1/25/2018  8:15 AM   Action Taken Open ports on tubing capped 1/25/2018  8:15 AM   Alcohol Cap Used Yes 1/25/2018  8:15 AM        Opportunity for questions and clarification was provided. Patient transported with:   Tech from [employer], belongings, family member. Transfer canceled - DC orders received. IV access and telemetry dc'd. DC orders and medications reviewed with pts grandson. Opportunity for questions provided. Grandson verbalized understanding. Request for volunteer transfer placed.

## 2018-01-25 NOTE — PROGRESS NOTES
Problem: Falls - Risk of  Goal: *Absence of Falls  Document Lázaro Fall Risk and appropriate interventions in the flowsheet.    Outcome: Progressing Towards Goal  Fall Risk Interventions:  Mobility Interventions: Assess mobility with egress test, Bed/chair exit alarm, Communicate number of staff needed for ambulation/transfer    Mentation Interventions: Adequate sleep, hydration, pain control, Bed/chair exit alarm, Door open when patient unattended, Evaluate medications/consider consulting pharmacy    Medication Interventions: Assess postural VS orthostatic hypotension, Bed/chair exit alarm, Evaluate medications/consider consulting pharmacy    Elimination Interventions: Call light in reach, Bed/chair exit alarm, Patient to call for help with toileting needs

## 2018-01-25 NOTE — DIABETES MGMT
DTC Progress Note    Recommendations/ Comments: Chart reviewed for continued hypoglycemia. FBG 64 mg/dL today. Post prandial BG's have been elevated but improving with addition of Glipizide 10 mg at breakfast yesterday. If appropriate please consider  Decrease Glipizide to 5 mg daily  Change lispro correction scale to high sensitivity  Document po intake    Currently on Glipizide 10 mg daily each AM  Lispro correction scale, normal sensitivity  Metformin 1000 mg BID ordered - begins 1-    DTC to continue to follow. Patient is a 80 y.o. male with a history of Type 2 Diabetes on oral agents (dual therapy): glimepiride (Amaryl), metformin (generic) at home. A1c:   Lab Results   Component Value Date/Time    Hemoglobin A1c 8.5 01/15/2018 09:41 AM    Hemoglobin A1c 7.7 11/08/2017 11:56 AM       Recent Glucose Results:   Lab Results   Component Value Date/Time    GLUCPOC 153 (H) 01/25/2018 07:13 AM    GLUCPOC 64 (L) 01/25/2018 06:52 AM    GLUCPOC 108 (H) 01/24/2018 09:12 PM        Lab Results   Component Value Date/Time    Creatinine 1.26 01/23/2018 04:27 AM     Estimated Creatinine Clearance: 30.9 mL/min (based on Cr of 1.26). Active Orders   Diet    DIET DIABETIC CONSISTENT CARB Regular; 2 GM NA (House Low NA)        PO intake:   Patient Vitals for the past 72 hrs:   % Diet Eaten   01/24/18 1133 100 %       Will continue to follow as needed.     Thank you  Hermelinda Morelos RN, CDE

## 2018-01-25 NOTE — PROGRESS NOTES
3268 BS 64. 25ml IV dextrose given per protocol. Rechecked . Will notify day shift. Problem: Sepsis: Day 5  Goal: *Oxygen saturation within defined limits  Outcome: Progressing Towards Goal  Pt maintaining O2 sats over 95% on RA overnight. Goal: Activity/Safety  Outcome: Progressing Towards Goal  Pt tolerating activity with sats above 95%. Pt remains free of falls during admission. Call bell and frequently used items within reach. Bedside table within reach. Patient provided non skid socks and instructed to call out for nurse when in need of assistance. Goal: Medications  Outcome: Progressing Towards Goal  Pt on IV fluids  Goal: Psychosocial  Outcome: Progressing Towards Goal  Family at bedside for support. Assessed pt for anxiety.

## 2018-01-25 NOTE — PROGRESS NOTES
1600 - Bedside report received from [place], [address] [place] [address]. Pt resting comfortably in bed in NAD. 1930 - Bedside shift change report given to Mac Baltazar RN (oncoming nurse) by Casandra Joseph RN (offgoing nurse). Report included the following information SBAR, Kardex, ED Summary, Procedure Summary, Intake/Output, MAR, Recent Results and Med Rec Status. Opportunity for questions provided. q1h rounds completed during shift.

## 2018-01-25 NOTE — DISCHARGE INSTRUCTIONS
Monitor Blood pressure daily. In your next appointment shows your Blood pressure reading to your Physician. Exercise every day.   Need a colonoscpy

## 2018-01-26 LAB
BACTERIA SPEC CULT: NORMAL
SERVICE CMNT-IMP: NORMAL

## 2018-02-07 ENCOUNTER — OFFICE VISIT (OUTPATIENT)
Dept: FAMILY MEDICINE CLINIC | Age: 83
End: 2018-02-07

## 2018-02-07 VITALS
SYSTOLIC BLOOD PRESSURE: 142 MMHG | TEMPERATURE: 98.2 F | DIASTOLIC BLOOD PRESSURE: 58 MMHG | HEART RATE: 63 BPM | WEIGHT: 118 LBS | BODY MASS INDEX: 23.05 KG/M2

## 2018-02-07 DIAGNOSIS — Z23 ENCOUNTER FOR IMMUNIZATION: ICD-10-CM

## 2018-02-07 DIAGNOSIS — E78.2 MIXED HYPERLIPIDEMIA: ICD-10-CM

## 2018-02-07 DIAGNOSIS — I10 ESSENTIAL HYPERTENSION: ICD-10-CM

## 2018-02-07 DIAGNOSIS — Z71.89 COUNSELING AND COORDINATION OF CARE: Primary | ICD-10-CM

## 2018-02-07 DIAGNOSIS — E11.9 CONTROLLED TYPE 2 DIABETES MELLITUS WITHOUT COMPLICATION, WITHOUT LONG-TERM CURRENT USE OF INSULIN (HCC): Primary | ICD-10-CM

## 2018-02-07 PROBLEM — E11.21 TYPE 2 DIABETES MELLITUS WITH NEPHROPATHY (HCC): Status: ACTIVE | Noted: 2018-02-07

## 2018-02-07 LAB — GLUCOSE POC: NORMAL MG/DL

## 2018-02-07 RX ORDER — GLIMEPIRIDE 4 MG/1
4 TABLET ORAL
Qty: 30 TAB | Refills: 5 | Status: SHIPPED | OUTPATIENT
Start: 2018-02-07 | End: 2018-03-13 | Stop reason: SDUPTHER

## 2018-02-07 RX ORDER — METFORMIN HYDROCHLORIDE 1000 MG/1
1000 TABLET ORAL 2 TIMES DAILY WITH MEALS
Qty: 180 TAB | Refills: 2 | Status: SHIPPED | OUTPATIENT
Start: 2018-02-07 | End: 2018-03-13 | Stop reason: SDUPTHER

## 2018-02-07 RX ORDER — SIMVASTATIN 20 MG/1
20 TABLET, FILM COATED ORAL
Qty: 90 TAB | Refills: 0 | Status: SHIPPED | OUTPATIENT
Start: 2018-02-07 | End: 2018-03-13 | Stop reason: SDUPTHER

## 2018-02-07 RX ORDER — HYDROCHLOROTHIAZIDE 12.5 MG/1
12.5 CAPSULE ORAL DAILY
Qty: 90 CAP | Refills: 0 | Status: SHIPPED | OUTPATIENT
Start: 2018-02-07 | End: 2018-03-13 | Stop reason: SDUPTHER

## 2018-02-07 NOTE — PATIENT INSTRUCTIONS

## 2018-02-07 NOTE — MR AVS SNAPSHOT
[address] [address] [address] [facility] 
[phone] Patient: Sunny Mclean MRN: [id number] WBF:[birth year] Visit Information Date & Time Provider Department Dept. Phone Encounter #  
 2/7/2018  1:15 PM Lisa Padron NP [address] [facility] [phone] Follow-up Instructions Return in about 6 weeks (around 3/21/2018). Upcoming Health Maintenance Date Due MICROALBUMIN Q1 1/1/1944 EYE EXAM RETINAL OR DILATED Q1 1/1/1944 DTaP/Tdap/Td series (1 - Tdap) 1/1/1955 ZOSTER VACCINE AGE 60> 11/1/1993 GLAUCOMA SCREENING Q2Y 1/1/1999 Pneumococcal 65+ Low/Medium Risk (1 of 2 - PCV13) 1/1/1999 MEDICARE YEARLY EXAM 1/1/1999 HEMOGLOBIN A1C Q6M 7/15/2018 FOOT EXAM Q1 10/27/2018 LIPID PANEL Q1 1/21/2019 Allergies as of 2/7/2018  Review Complete On: 2/7/2018 By: Lisa Padron NP Severity Noted Reaction Type Reactions Ace Inhibitors  12/27/2017    Angioedema Current Immunizations  Reviewed on 10/27/2017 Name Date Influenza Vaccine (Quad) PF 10/27/2017 Not reviewed this visit You Were Diagnosed With   
  
 Codes Comments Controlled type 2 diabetes mellitus without complication, without long-term current use of insulin ([facility]    -  Primary ICD-10-CM: E11.9 ICD-9-CM: 250.00 Essential hypertension     ICD-10-CM: I10 
ICD-9-CM: 401.9 Mixed hyperlipidemia     ICD-10-CM: E78.2 ICD-9-CM: 272.2 Vitals BP Pulse Temp Weight(growth percentile) BMI Smoking Status 142/58 (BP 1 Location: Left arm, BP Patient Position: Sitting) 63 98.2 °F (36.8 °C) (Oral) 118 lb (53.5 kg) 23.05 kg/m2 Former Smoker Vitals History BMI and BSA Data Body Mass Index Body Surface Area 23.05 kg/m 2 1.5 m 2 Preferred Pharmacy Pharmacy Name Phone [phone] [facility], [address] [facility] [address] [place] [address] [phone] Your Updated Medication List  
  
   
This list is accurate as of: 2/7/18  2:57 PM.  Always use your most recent med list. amLODIPine 10 mg tablet Commonly known as:  Lynnell Manual Take 1 Tab by mouth daily. aspirin delayed-release 81 mg tablet Take 81 mg by mouth daily. atenolol 50 mg tablet Commonly known as:  TENORMIN Take 1 Tab by mouth daily. glimepiride 4 mg tablet Commonly known as:  AMARYL Take 1 Tab by mouth every morning. Take 20 min before meals. hydrALAZINE 10 mg tablet Commonly known as:  APRESOLINE Take 1 Tab by mouth three (3) times daily. hydroCHLOROthiazide 12.5 mg capsule Commonly known as:  Marikay Portal Take 1 Cap by mouth daily. METAMUCIL 3.4 gram/5.4 gram Powd Generic drug:  psyllium husk Take 5.4 g by mouth daily. W/ glass of water. Drink another glass of water after taking fiber. metFORMIN 1,000 mg tablet Commonly known as:  GLUCOPHAGE Take 1 Tab by mouth two (2) times daily (with meals). simvastatin 20 mg tablet Commonly known as:  ZOCOR Take 1 Tab by mouth nightly. For cholesterol  
  
 tamsulosin 0.4 mg capsule Commonly known as:  FLOMAX Take 1 Cap by mouth nightly. For prostate/urine. Prescriptions Sent to Pharmacy Refills  
 glimepiride (AMARYL) 4 mg tablet 5 Sig: Take 1 Tab by mouth every morning. Take 20 min before meals. Class: Normal  
 Pharmacy: [address] Ph #: [phone] Route: Oral  
 hydroCHLOROthiazide (MICROZIDE) 12.5 mg capsule 0 Sig: Take 1 Cap by mouth daily. Class: Normal  
 Pharmacy: [address] Ph #: [phone] Route: Oral  
 simvastatin (ZOCOR) 20 mg tablet 0 Sig: Take 1 Tab by mouth nightly. For cholesterol  Class: Normal  
 Pharmacy: [address] [facility] Ph #: [phone] Route: Oral  
 metFORMIN (GLUCOPHAGE) 1,000 mg tablet 2 Sig: Take 1 Tab by mouth two (2) times daily (with meals). Class: Normal  
 Pharmacy: [facility] [address], [address] Ph #: [phone] Route: Oral  
  
We Performed the Following AMB POC GLUCOSE BLOOD, BY GLUCOSE MONITORING DEVICE [27304 CPT(R)] Follow-up Instructions Return in about 6 weeks (around 3/21/2018). To-Do List   
 02/23/2018 Lab:  METABOLIC PANEL, COMPREHENSIVE Patient Instructions Learning About Diabetes Food Guidelines Your Care Instructions Meal planning is important to manage diabetes. It helps keep your blood sugar at a target level (which you set with your doctor). You don't have to eat special foods. You can eat what your family eats, including sweets once in a while. But you do have to pay attention to how often you eat and how much you eat of certain foods. You may want to work with a dietitian or a certified diabetes educator (CDE) to help you plan meals and snacks. A dietitian or CDE can also help you lose weight if that is one of your goals. What should you know about eating carbs? Managing the amount of carbohydrate (carbs) you eat is an important part of healthy meals when you have diabetes. Carbohydrate is found in many foods. · Learn which foods have carbs. And learn the amounts of carbs in different foods. ¨ Bread, cereal, pasta, and rice have about 15 grams of carbs in a serving. A serving is 1 slice of bread (1 ounce), ½ cup of cooked cereal, or 1/3 cup of cooked pasta or rice. ¨ Fruits have 15 grams of carbs in a serving. A serving is 1 small fresh fruit, such as an apple or orange; ½ of a banana; ½ cup of cooked or canned fruit; ½ cup of fruit juice; 1 cup of melon or raspberries; or 2 tablespoons of dried fruit. ¨ Milk and no-sugar-added yogurt have 15 grams of carbs in a serving.  A serving is 1 cup of milk or 2/3 cup of no-sugar-added yogurt. ¨ Starchy vegetables have 15 grams of carbs in a serving. A serving is ½ cup of mashed potatoes or sweet potato; 1 cup winter squash; ½ of a small baked potato; ½ cup of cooked beans; or ½ cup cooked corn or green peas. · Learn how much carbs to eat each day and at each meal. A dietitian or CDE can teach you how to keep track of the amount of carbs you eat. This is called carbohydrate counting. · If you are not sure how to count carbohydrate grams, use the Plate Method to plan meals. It is a good, quick way to make sure that you have a balanced meal. It also helps you spread carbs throughout the day. ¨ Divide your plate by types of foods. Put non-starchy vegetables on half the plate, meat or other protein food on one-quarter of the plate, and a grain or starchy vegetable in the final quarter of the plate. To this you can add a small piece of fruit and 1 cup of milk or yogurt, depending on how many carbs you are supposed to eat at a meal. 
· Try to eat about the same amount of carbs at each meal. Do not \"save up\" your daily allowance of carbs to eat at one meal. 
· Proteins have very little or no carbs per serving. Examples of proteins are beef, chicken, turkey, fish, eggs, tofu, cheese, cottage cheese, and peanut butter. A serving size of meat is 3 ounces, which is about the size of a deck of cards. Examples of meat substitute serving sizes (equal to 1 ounce of meat) are 1/4 cup of cottage cheese, 1 egg, 1 tablespoon of peanut butter, and ½ cup of tofu. How can you eat out and still eat healthy? · Learn to estimate the serving sizes of foods that have carbohydrate. If you measure food at home, it will be easier to estimate the amount in a serving of restaurant food. · If the meal you order has too much carbohydrate (such as potatoes, corn, or baked beans), ask to have a low-carbohydrate food instead. Ask for a salad or green vegetables. · If you use insulin, check your blood sugar before and after eating out to help you plan how much to eat in the future. · If you eat more carbohydrate at a meal than you had planned, take a walk or do other exercise. This will help lower your blood sugar. What else should you know? · Limit saturated fat, such as the fat from meat and dairy products. This is a healthy choice because people who have diabetes are at higher risk of heart disease. So choose lean cuts of meat and nonfat or low-fat dairy products. Use olive or canola oil instead of butter or shortening when cooking. · Don't skip meals. Your blood sugar may drop too low if you skip meals and take insulin or certain medicines for diabetes. · Check with your doctor before you drink alcohol. Alcohol can cause your blood sugar to drop too low. Alcohol can also cause a bad reaction if you take certain diabetes medicines. Follow-up care is a key part of your treatment and safety. Be sure to make and go to all appointments, and call your doctor if you are having problems. It's also a good idea to know your test results and keep a list of the medicines you take. Where can you learn more? Go to http://demond-felicia.info/. Enter I198 in the search box to learn more about \"Learning About Diabetes Food Guidelines. \" Current as of: March 13, 2017 Content Version: 11.4 © 4610-0220 Healthwise, Incorporated. Care instructions adapted under license by [employer] (which disclaims liability or warranty for this information). If you have questions about a medical condition or this instruction, always ask your healthcare professional. [address] [address] any warranty or liability for your use of this information. Introducing [facility]! [place] introduces Float: [place] patient portal. Now you can access parts of your medical record, email your doctor's office, and request medication refills online. 1. In your internet browser, go to [email or web address] 2. Click on the First Time User? Click Here link in the Sign In box. You will see the New Member Sign Up page. 3. Enter your [employer] Access Code exactly as it appears below. You will not need to use this code after youve completed the sign-up process. If you do not sign up before the expiration date, you must request a new code. · [employer] Access Code: [email or web address] Expires: 4/25/2018  2:33 PM 
 
4. Enter the last four digits of your Social Security Number (xxxx) and Date of Birth (mm/dd/yyyy) as indicated and click Submit. You will be taken to the next sign-up page. 5. Create a [employer] ID. This will be your [employer] login ID and cannot be changed, so think of one that is secure and easy to remember. 6. Create a [employer] password. You can change your password at any time. 7. Enter your Password Reset Question and Answer. This can be used at a later time if you forget your password. 8. Enter your e-mail address. You will receive e-mail notification when new information is available in [address] 9. Click Sign Up. You can now view and download portions of your medical record. 10. Click the Download Summary menu link to download a portable copy of your medical information. If you have questions, please visit the Frequently Asked Questions section of the [employer] website. Remember, [employer] is NOT to be used for urgent needs. For medical emergencies, dial 911. Now available from your iPhone and Android! Please provide this summary of care documentation to your next provider. Your primary care clinician is listed as Ger Brantley. Raymond Knox. If you have any questions after today's visit, please call [phone].

## 2018-02-07 NOTE — PROGRESS NOTES
Subjective:     Chief Complaint   Patient presents with   [address] Follow up        He  is a 80 y.o. male who presents for evaluation of  DM/HTN and new c/o of L foot pain. Pt is accompanied by son Mynor Quintanilla. Normally Romero helps patients. He has BP and glucose log w/ him. SBPs have been consistently in the 150s. Glucoses have been in the 200s-140s pre-prandial.     Has been noting BPH S&S for multiple years. Poor relief from Flomax. Foot pain is intermittent, bilateral and last for 1 hr then resolves. No associated edema nor wounds. Son is also requesting DDS info r/t Pt complains of chronic DDS pain. ROS  Gen - no fever/chills  Resp - no dyspnea or cough  CV - no chest pain or COYNE  Rest per HPI    Past Medical History:   Diagnosis Date    Controlled type 2 diabetes mellitus without complication, without long-term current use of insulin ([facility] 10/27/2017    Essential hypertension 10/27/2017    Mixed hyperlipidemia 10/27/2017     No past surgical history on file. Current Outpatient Prescriptions on File Prior to Visit   Medication Sig Dispense Refill    amLODIPine (NORVASC) 10 mg tablet Take 1 Tab by mouth daily. 30 Tab 6    atenolol (TENORMIN) 50 mg tablet Take 1 Tab by mouth daily. 30 Tab 6    hydrALAZINE (APRESOLINE) 10 mg tablet Take 1 Tab by mouth three (3) times daily. 90 Tab 6    aspirin delayed-release 81 mg tablet Take 81 mg by mouth daily.  psyllium husk (METAMUCIL) 3.4 gram/5.4 gram powd Take 5.4 g by mouth daily. W/ glass of water. Drink another glass of water after taking fiber. 600 g 3    tamsulosin (FLOMAX) 0.4 mg capsule Take 1 Cap by mouth nightly. For prostate/urine. 30 Cap 2    hydroCHLOROthiazide (MICROZIDE) 12.5 mg capsule Take 1 Cap by mouth daily.  90 Cap 0    triamcinolone acetonide (KENALOG) 0.1 % topical cream Apply to tops of feet only if itchy twice a day, use thin layer; always use Eucerin cream twice a day 15 g 0  metFORMIN (GLUCOPHAGE) 1,000 mg tablet Take 1 Tab by mouth two (2) times daily (with meals). 60 Tab 2    glimepiride (AMARYL) 2 mg tablet Take 1 Tab by mouth every morning. 30 Tab 2    simvastatin (ZOCOR) 20 mg tablet Take 1 Tab by mouth nightly for 360 days. For cholesterol 30 Tab 2     No current facility-administered medications on file prior to visit. Objective:     Vitals:    02/07/18 1352   BP: 142/58   Pulse: 63   Temp: 98.2 °F (36.8 °C)   TempSrc: Oral   Weight: 118 lb (53.5 kg)       Physical Examination:  General appearance - alert, well appearing, and in no distress  Eyes -sclera anicteric  Neck - supple, no significant adenopathy, no thyromegaly  Chest - clear to auscultation, no wheezes, rales or rhonchi, symmetric air entry  Heart - normal rate, regular rhythm, normal S1, S2, no murmurs, rubs, clicks or gallops  Neurological - alert, oriented, no focal findings or movement disorder noted    R foot: no edema, DP pulse +2, skin intact, no point tenderness, ROM WNL,    Assessment/ Plan:   Diagnoses and all orders for this visit:    1. Controlled type 2 diabetes mellitus without complication, without long-term current use of insulin ([facility])  -     AMB POC GLUCOSE BLOOD, BY GLUCOSE MONITORING DEVICE  -     METABOLIC PANEL, COMPREHENSIVE; Future  -     glimepiride (AMARYL) 4 mg tablet; Take 1 Tab by mouth every morning. Take 20 min before meals. -     metFORMIN (GLUCOPHAGE) 1,000 mg tablet; Take 1 Tab by mouth two (2) times daily (with meals). 2. Essential hypertension  -     METABOLIC PANEL, COMPREHENSIVE; Future  -     hydroCHLOROthiazide (MICROZIDE) 12.5 mg capsule; Take 1 Cap by mouth daily. 3. Mixed hyperlipidemia  -     METABOLIC PANEL, COMPREHENSIVE; Future  -     simvastatin (ZOCOR) 20 mg tablet; Take 1 Tab by mouth nightly. For cholesterol    4.  Encounter for immunization  -     Pneumococcal polysaccharide vaccine, 23-valent, adult or immunosuppressed pt dose         Increase Amaryl to 4mg daily. Refill current Rx noted refilled at discharge from [facility]. Given Pt's poor response to Flomax and poly pharmacy, will refer to [facility] urology for BPH S&S. Give PNA vaccine. Check renal function/K+ prior to NOV. Re-eval in 6-8 weeks prior to returning to [facility]. Declined ACP discussion r/t leaving country soon. I have discussed the diagnosis with the patient and the intended plan as seen in the above orders. The patient has received an after-visit summary and questions were answered concerning future plans. I have discussed medication side effects and warnings with the patient as well. The patient verbalizes understanding and agreement with the plan.     Follow-up Disposition: Not on File

## 2018-02-07 NOTE — TELEPHONE ENCOUNTER
The patient has a provider appointment scheduled for today. Closing this encounter.  Prem Navarrete RN

## 2018-02-07 NOTE — PROGRESS NOTES
AVS printed and reviewed. Dental resources for [facility] printed. Good Rx prices reviewed for meds and coupon printed for Simvastatin ordered at [place] for cost of $30.17. Vaccine information  sheet printed in [facility] for pt. Appts scheduled for f/u.

## 2018-02-07 NOTE — PROGRESS NOTES
Coordination of Care  1. Have you been to the ER, urgent care clinic since your last visit? Hospitalized since your last visit? Yes When: 1/21/18  [facility]    2. Have you seen or consulted any other health care providers outside of the [address] [place] [address] since your last visit? Include any pap smears or colon screening. No    Medications  Does the patient need refills?  YES    Learning Assessment Complete? yes  Results for orders placed or performed in visit on 02/07/18   AMB POC GLUCOSE BLOOD, BY GLUCOSE MONITORING DEVICE   Result Value Ref Range    Glucose  non fasting mg/dL

## 2018-02-07 NOTE — PROGRESS NOTES
Statements below were documented by Michelle Lew RNRequest for pneumonia vaccine 23, patient denies any allergies. Patient given VIS sheet and information about pneumonia shot , patient verbalized understanding and denies questions. Pneumonia shot administered per protocol . After administering injection, patient waited for 15 minutes. Patient showed no signs or symptoms of an allergic reactionPatient denies redness or itching from IM injection site. Michelle Lew RN

## 2018-02-14 ENCOUNTER — OFFICE VISIT (OUTPATIENT)
Dept: FAMILY MEDICINE CLINIC | Age: 83
End: 2018-02-14

## 2018-02-14 VITALS
DIASTOLIC BLOOD PRESSURE: 65 MMHG | BODY MASS INDEX: 23.63 KG/M2 | TEMPERATURE: 97.8 F | HEART RATE: 74 BPM | SYSTOLIC BLOOD PRESSURE: 141 MMHG | WEIGHT: 121 LBS

## 2018-02-14 DIAGNOSIS — R39.11 BENIGN PROSTATIC HYPERPLASIA WITH URINARY HESITANCY: Primary | ICD-10-CM

## 2018-02-14 DIAGNOSIS — N40.1 BENIGN PROSTATIC HYPERPLASIA WITH URINARY HESITANCY: Primary | ICD-10-CM

## 2018-02-20 ENCOUNTER — OFFICE VISIT (OUTPATIENT)
Dept: FAMILY MEDICINE CLINIC | Age: 83
End: 2018-02-20

## 2018-02-20 ENCOUNTER — CLINICAL SUPPORT (OUTPATIENT)
Dept: FAMILY MEDICINE CLINIC | Age: 83
End: 2018-02-20

## 2018-02-20 ENCOUNTER — HOSPITAL ENCOUNTER (OUTPATIENT)
Dept: LAB | Age: 83
Discharge: HOME | End: 2018-02-20

## 2018-02-20 DIAGNOSIS — E78.2 MIXED HYPERLIPIDEMIA: ICD-10-CM

## 2018-02-20 DIAGNOSIS — Z71.89 COUNSELING AND COORDINATION OF CARE: Primary | ICD-10-CM

## 2018-02-20 DIAGNOSIS — I10 ESSENTIAL HYPERTENSION: ICD-10-CM

## 2018-02-20 DIAGNOSIS — E11.9 CONTROLLED TYPE 2 DIABETES MELLITUS WITHOUT COMPLICATION, WITHOUT LONG-TERM CURRENT USE OF INSULIN (HCC): ICD-10-CM

## 2018-02-20 LAB
ALBUMIN SERPL-MCNC: 3.9 G/DL (ref 3.5–5)
ALBUMIN/GLOB SERPL: 0.9 {RATIO} (ref 1.1–2.2)
ALP SERPL-CCNC: 53 U/L (ref 45–117)
ALT SERPL-CCNC: 18 U/L (ref 12–78)
ANION GAP SERPL CALC-SCNC: 7 MMOL/L (ref 5–15)
AST SERPL-CCNC: 28 U/L (ref 15–37)
BILIRUB SERPL-MCNC: 0.5 MG/DL (ref 0.2–1)
BUN SERPL-MCNC: 19 MG/DL (ref 6–20)
BUN/CREAT SERPL: 17 (ref 12–20)
CALCIUM SERPL-MCNC: 9.2 MG/DL (ref 8.5–10.1)
CHLORIDE SERPL-SCNC: 102 MMOL/L (ref 97–108)
CO2 SERPL-SCNC: 28 MMOL/L (ref 21–32)
CREAT SERPL-MCNC: 1.15 MG/DL (ref 0.7–1.3)
GLOBULIN SER CALC-MCNC: 4.4 G/DL (ref 2–4)
GLUCOSE SERPL-MCNC: 161 MG/DL (ref 65–100)
POTASSIUM SERPL-SCNC: 4.7 MMOL/L (ref 3.5–5.1)
PROT SERPL-MCNC: 8.3 G/DL (ref 6.4–8.2)
SODIUM SERPL-SCNC: 137 MMOL/L (ref 136–145)

## 2018-02-20 PROCEDURE — 80053 COMPREHEN METABOLIC PANEL: CPT | Performed by: NURSE PRACTITIONER

## 2018-03-13 ENCOUNTER — OFFICE VISIT (OUTPATIENT)
Dept: FAMILY MEDICINE CLINIC | Age: 83
End: 2018-03-13

## 2018-03-13 VITALS
SYSTOLIC BLOOD PRESSURE: 164 MMHG | WEIGHT: 119 LBS | DIASTOLIC BLOOD PRESSURE: 85 MMHG | TEMPERATURE: 98.4 F | HEART RATE: 69 BPM | BODY MASS INDEX: 23.24 KG/M2

## 2018-03-13 DIAGNOSIS — I10 ESSENTIAL HYPERTENSION: ICD-10-CM

## 2018-03-13 DIAGNOSIS — E78.2 MIXED HYPERLIPIDEMIA: ICD-10-CM

## 2018-03-13 DIAGNOSIS — R39.11 BENIGN PROSTATIC HYPERPLASIA WITH URINARY HESITANCY: ICD-10-CM

## 2018-03-13 DIAGNOSIS — E11.9 CONTROLLED TYPE 2 DIABETES MELLITUS WITHOUT COMPLICATION, WITHOUT LONG-TERM CURRENT USE OF INSULIN (HCC): Primary | ICD-10-CM

## 2018-03-13 DIAGNOSIS — N40.1 BENIGN PROSTATIC HYPERPLASIA WITH URINARY HESITANCY: ICD-10-CM

## 2018-03-13 RX ORDER — TRIAMCINOLONE ACETONIDE 1 MG/G
CREAM TOPICAL 2 TIMES DAILY
Qty: 80 G | Refills: 3 | Status: SHIPPED | OUTPATIENT
Start: 2018-03-13

## 2018-03-13 RX ORDER — GLIMEPIRIDE 4 MG/1
4 TABLET ORAL
Qty: 90 TAB | Refills: 3 | Status: SHIPPED | OUTPATIENT
Start: 2018-03-13

## 2018-03-13 RX ORDER — TAMSULOSIN HYDROCHLORIDE 0.4 MG/1
0.4 CAPSULE ORAL
Qty: 90 CAP | Refills: 3 | Status: SHIPPED | OUTPATIENT
Start: 2018-03-13

## 2018-03-13 RX ORDER — SIMVASTATIN 20 MG/1
20 TABLET, FILM COATED ORAL
Qty: 90 TAB | Refills: 3 | Status: SHIPPED | OUTPATIENT
Start: 2018-03-13

## 2018-03-13 RX ORDER — HYDROCHLOROTHIAZIDE 12.5 MG/1
12.5 CAPSULE ORAL DAILY
Qty: 90 CAP | Refills: 3 | Status: SHIPPED | OUTPATIENT
Start: 2018-03-13

## 2018-03-13 RX ORDER — AMLODIPINE BESYLATE 10 MG/1
10 TABLET ORAL DAILY
Qty: 90 TAB | Refills: 3 | Status: SHIPPED | OUTPATIENT
Start: 2018-03-13

## 2018-03-13 RX ORDER — ATENOLOL 50 MG/1
50 TABLET ORAL DAILY
Qty: 90 TAB | Refills: 3 | Status: SHIPPED | OUTPATIENT
Start: 2018-03-13

## 2018-03-13 RX ORDER — HYDRALAZINE HYDROCHLORIDE 10 MG/1
10 TABLET, FILM COATED ORAL 3 TIMES DAILY
Qty: 270 TAB | Refills: 3 | Status: SHIPPED | OUTPATIENT
Start: 2018-03-13

## 2018-03-13 RX ORDER — METFORMIN HYDROCHLORIDE 1000 MG/1
1000 TABLET ORAL 2 TIMES DAILY WITH MEALS
Qty: 180 TAB | Refills: 2 | Status: SHIPPED | OUTPATIENT
Start: 2018-03-13

## 2018-03-13 NOTE — PROGRESS NOTES
Subjective:     Chief Complaint   Patient presents with    Diabetes     f/u        He  is a 80 y.o. male who presents for evaluation of HTN and DM 2. Pt's nephew is present and supplementing Hx/interpreting per Pt request.       Pt reports he did not take his BP Rx this AM.     Has been checking sugars and BP sporadicially, usually from mid to upper 100s. BPs usually in the 140s-150s. No symptomatic episodes/low glucose episodes. Pt is returning to [facility] next week and needs refills. Notes mod improvement w/ PSA S&S since starting Flomax. ROS  Gen - no fever/chills  Resp - no dyspnea or cough  CV - no chest pain or COYNE  Rest per HPI    Past Medical History:   Diagnosis Date    Controlled type 2 diabetes mellitus without complication, without long-term current use of insulin ([facility] 10/27/2017    Essential hypertension 10/27/2017    Mixed hyperlipidemia 10/27/2017     No past surgical history on file. Current Outpatient Prescriptions on File Prior to Visit   Medication Sig Dispense Refill    glimepiride (AMARYL) 4 mg tablet Take 1 Tab by mouth every morning. Take 20 min before meals. 30 Tab 5    hydroCHLOROthiazide (MICROZIDE) 12.5 mg capsule Take 1 Cap by mouth daily. 90 Cap 0    simvastatin (ZOCOR) 20 mg tablet Take 1 Tab by mouth nightly. For cholesterol 90 Tab 0    metFORMIN (GLUCOPHAGE) 1,000 mg tablet Take 1 Tab by mouth two (2) times daily (with meals). 180 Tab 2    amLODIPine (NORVASC) 10 mg tablet Take 1 Tab by mouth daily. 30 Tab 6    atenolol (TENORMIN) 50 mg tablet Take 1 Tab by mouth daily. 30 Tab 6    hydrALAZINE (APRESOLINE) 10 mg tablet Take 1 Tab by mouth three (3) times daily. 90 Tab 6    aspirin delayed-release 81 mg tablet Take 81 mg by mouth daily.  psyllium husk (METAMUCIL) 3.4 gram/5.4 gram powd Take 5.4 g by mouth daily. W/ glass of water. Drink another glass of water after taking fiber.  600 g 3    tamsulosin (FLOMAX) 0.4 mg capsule Take 1 Cap by mouth nightly. For prostate/urine. 30 Cap 2     No current facility-administered medications on file prior to visit. Objective:     Vitals:    03/13/18 1320 03/13/18 1321   BP: 187/81 164/85   Pulse: 73 69   Temp: 98.4 °F (36.9 °C)    TempSrc: Oral    Weight: 119 lb (54 kg)        Physical Examination:  General appearance - alert, well appearing, and in no distress    Counseling only     No results found for this or any previous visit (from the past 12 hour(s)). Assessment/ Plan:   Diagnoses and all orders for this visit:    1. Controlled type 2 diabetes mellitus without complication, without long-term current use of insulin ([facility])  -     AMB POC GLUCOSE BLOOD, BY GLUCOSE MONITORING DEVICE  -     glimepiride (AMARYL) 4 mg tablet; Take 1 Tab by mouth every morning. Take 20 min before meals. -     metFORMIN (GLUCOPHAGE) 1,000 mg tablet; Take 1 Tab by mouth two (2) times daily (with meals). 2. Essential hypertension  -     amLODIPine (NORVASC) 10 mg tablet; Take 1 Tab by mouth daily. -     atenolol (TENORMIN) 50 mg tablet; Take 1 Tab by mouth daily. -     hydrALAZINE (APRESOLINE) 10 mg tablet; Take 1 Tab by mouth three (3) times daily. -     hydroCHLOROthiazide (MICROZIDE) 12.5 mg capsule; Take 1 Cap by mouth daily. 3. Mixed hyperlipidemia  -     simvastatin (ZOCOR) 20 mg tablet; Take 1 Tab by mouth nightly. For cholesterol    4. Benign prostatic hyperplasia with urinary hesitancy  -     tamsulosin (FLOMAX) 0.4 mg capsule; Take 1 Cap by mouth nightly. For prostate/urine. Other orders  -     triamcinolone acetonide (KENALOG) 0.1 % topical cream; Apply  to affected area two (2) times a day. use thin layer         Refill current Rx. Given Pt's pending departure/return to [facility], will defer from making Rx adjustment for HTN nor DM 2. GoodRx coupons provided for norvasc and Flomax. RTC PRN if/when Pt returns.        I have discussed the diagnosis with the patient and the intended plan as seen in the above orders. The patient has received an after-visit summary and questions were answered concerning future plans. I have discussed medication side effects and warnings with the patient as well. The patient verbalizes understanding and agreement with the plan.     Follow-up Disposition: Not on File

## 2018-03-13 NOTE — PATIENT INSTRUCTIONS

## 2018-03-13 NOTE — PROGRESS NOTES
Coordination of Care  1. Have you been to the ER, urgent care clinic since your last visit? Hospitalized since your last visit? No    2. Have you seen or consulted any other health care providers outside of the [address] [place] [address] since your last visit? Include any pap smears or colon screening. No    Does the patient need refills? YES    Learning Assessment Complete?  yes

## 2020-07-02 NOTE — IP AVS SNAPSHOT
[address] 
[phone] Patient: Sherice Smith MRN: [id number] URB:[id number] About your hospitalization You were admitted on:  January 21, 2018 You last received care in the:  [facility] [place] [facility] You were discharged on:  January 25, 2018 Why you were hospitalized Your primary diagnosis was:  Acute Delirium Follow-up Information Follow up With Details Comments Contact Info Bryant Johnson NP On 2/7/2018 Hospital follow up PCP appointment on Wednesday, 2/7/18 @ 1:15 p.m. located @ [address] [facility], [address], [facility], [address] [facility] 
[phone] Your Scheduled Appointments Wednesday February 07, 2018  1:15 PM EST Follow Up with Bryant Johnson NP  
[facility] ([facility] [address] [facility]  
[phone] Discharge Orders None A check pamela indicates which time of day the medication should be taken. My Medications START taking these medications Instructions Each Dose to Equal  
 Morning Noon Evening Bedtime  
 atenolol 50 mg tablet Commonly known as:  TENORMIN Start taking on:  1/26/2018 Your last dose was: Your next dose is: Take 1 Tab by mouth daily. 50 mg  
    
   
   
   
  
 hydrALAZINE 10 mg tablet Commonly known as:  APRESOLINE Your last dose was: Your next dose is: Take 1 Tab by mouth three (3) times daily. 10 mg CHANGE how you take these medications Instructions Each Dose to Equal  
 Morning Noon Evening Bedtime  
 amLODIPine 10 mg tablet Commonly known as:  Tori Cordova Start taking on:  1/26/2018 What changed:   
- medication strength 
- how much to take 
- additional instructions Your last dose was: Your next dose is: Take 1 Tab by mouth daily. 10 mg CONTINUE taking these medications Instructions Each Dose to Equal  
 Morning Noon Evening Bedtime  
 aspirin delayed-release 81 mg tablet Your last dose was: Your next dose is: Take 81 mg by mouth daily. 81 mg  
    
   
   
   
  
 glimepiride 2 mg tablet Commonly known as:  AMARYL Your last dose was: Your next dose is: Take 1 Tab by mouth every morning. 2 mg  
    
   
   
   
  
 hydroCHLOROthiazide 12.5 mg capsule Commonly known as:  Janette Shutters Your last dose was: Your next dose is: Take 1 Cap by mouth daily. 12.5 mg  
    
   
   
   
  
 METAMUCIL 3.4 gram/5.4 gram Powd Generic drug:  psyllium husk Your last dose was: Your next dose is: Take 5.4 g by mouth daily. W/ glass of water. Drink another glass of water after taking fiber. 5.4 g  
    
   
   
   
  
 metFORMIN 1,000 mg tablet Commonly known as:  GLUCOPHAGE Your last dose was: Your next dose is: Take 1 Tab by mouth two (2) times daily (with meals). 1000 mg  
    
   
   
   
  
 simvastatin 20 mg tablet Commonly known as:  ZOCOR Your last dose was: Your next dose is: Take 1 Tab by mouth nightly for 360 days. For cholesterol 20 mg  
    
   
   
   
  
 tamsulosin 0.4 mg capsule Commonly known as:  FLOMAX Your last dose was: Your next dose is: Take 1 Cap by mouth nightly. For prostate/urine. 0.4 mg  
    
   
   
   
  
 triamcinolone acetonide 0.1 % topical cream  
Commonly known as:  KENALOG Your last dose was: Your next dose is:    
   
   
 Apply to tops of feet only if itchy twice a day, use thin layer; always use Eucerin cream twice a day Where to Get Your Medications These medications were sent to [facility]  [address] [place] [address] Phone:  [phone]  
  amLODIPine 10 mg tablet  
 atenolol 50 mg tablet  
 hydrALAZINE 10 mg tablet Discharge Instructions Monitor Blood pressure daily. In your next appointment shows your Blood pressure reading to your Physician. Exercise every day Introducing [facility]! [employer] introduces Independent IP patient portal. Now you can access parts of your medical record, email your doctor's office, and request medication refills online. 1. In your internet browser, go to [email or web address] 2. Click on the First Time User? Click Here link in the Sign In box. You will see the New Member Sign Up page. 3. Enter your Independent IP Access Code exactly as it appears below. You will not need to use this code after youve completed the sign-up process. If you do not sign up before the expiration date, you must request a new code. · Independent IP Access Code: [email or web address] Expires: 1/25/2018  2:31 PM 
 
4. Enter the last four digits of your Social Security Number (xxxx) and Date of Birth (mm/dd/yyyy) as indicated and click Submit. You will be taken to the next sign-up page. 5. Create a True [employer] ID. This will be your Independent IP login ID and cannot be changed, so think of one that is secure and easy to remember. 6. Create a Independent IP password. You can change your password at any time. 7. Enter your Password Reset Question and Answer. This can be used at a later time if you forget your password. 8. Enter your e-mail address. You will receive e-mail notification when new information is available in [address] 9. Click Sign Up. You can now view and download portions of your medical record. 10. Click the Download Summary menu link to download a portable copy of your medical information. If you have questions, please visit the Frequently Asked Questions section of the [employer] website. Remember, [employer] is NOT to be used for urgent needs. For medical emergencies, dial 911. Now available from your iPhone and Android! Unresulted Labs-Please follow up with your PCP about these lab tests Order Current Status CULTURE, BLOOD, PAIRED Preliminary result Providers Seen During Your Hospitalization Provider Specialty Primary office phone Lela Vyas MD Emergency Medicine [phone] Bita Jane MD Internal Medicine [phone] Edin Faith MD Internal Medicine [phone] Carlton Evans MD Internal Medicine [phone] Your Primary Care Physician (PCP) Primary Care Physician Office Phone Office Fax Gold Joseph [phone] You are allergic to the following Allergen Reactions Ace Inhibitors Angioedema Recent Documentation Height Weight BMI Smoking Status 1.524 m 56.6 kg 24.37 kg/m2 Former Smoker Emergency Contacts Name Discharge Info Relation Home Work Mobile RitaRomero DISCHARGE CAREGIVER [3] Other Relative [6] [phone] Taylor Campa  Son [22] Patient Belongings The following personal items are in your possession at time of discharge: 
                             
 
  
  
 Please provide this summary of care documentation to your next provider. Signatures-by signing, you are acknowledging that this After Visit Summary has been reviewed with you and you have received a copy. Patient Signature:  ____________________________________________________________ Date:  ____________________________________________________________  
  
[place] [facility] Provider Signature:  ____________________________________________________________ Date:  ____________________________________________________________ Patient